# Patient Record
Sex: MALE | Race: OTHER | URBAN - METROPOLITAN AREA
[De-identification: names, ages, dates, MRNs, and addresses within clinical notes are randomized per-mention and may not be internally consistent; named-entity substitution may affect disease eponyms.]

---

## 2017-12-13 ENCOUNTER — EMERGENCY (EMERGENCY)
Facility: HOSPITAL | Age: 82
LOS: 1 days | Discharge: HOME | End: 2017-12-13
Attending: EMERGENCY MEDICINE
Payer: MEDICARE

## 2017-12-13 VITALS
TEMPERATURE: 98 F | SYSTOLIC BLOOD PRESSURE: 195 MMHG | HEART RATE: 63 BPM | OXYGEN SATURATION: 100 % | HEIGHT: 66 IN | DIASTOLIC BLOOD PRESSURE: 98 MMHG | RESPIRATION RATE: 16 BRPM | WEIGHT: 145.06 LBS

## 2017-12-13 DIAGNOSIS — Z90.79 ACQUIRED ABSENCE OF OTHER GENITAL ORGAN(S): Chronic | ICD-10-CM

## 2017-12-13 DIAGNOSIS — Z98.890 OTHER SPECIFIED POSTPROCEDURAL STATES: Chronic | ICD-10-CM

## 2017-12-13 PROCEDURE — 99282 EMERGENCY DEPT VISIT SF MDM: CPT

## 2017-12-13 PROCEDURE — 99283 EMERGENCY DEPT VISIT LOW MDM: CPT

## 2017-12-13 NOTE — ED PROVIDER NOTE - OBJECTIVE STATEMENT
84 y.o. male as per daughter, pt c/o upper eye lid discomfort on & off for past 2 yrs., for past few mos with increased irritation, no blurred vision, no teaaring, pt saw optometrist yest., told inflammation to Lt eye, given antibiotic ointment without improvement, no discharge, itchiness, 84 y.o. male as per daughter, pt c/o upper eye lid discomfort on & off for past 2 yrs., for past few mos with increased irritation, no blurred vision, no teaaring, pt saw optometrist yest., told inflammation to Lt eye, given antibiotic ointment without improvement, no discharge, itchiness,  Pt was treated with Tobramycin in 8/17 for similar problem

## 2017-12-13 NOTE — ED ADULT NURSE NOTE - CHIEF COMPLAINT
The patient is a 84y Male complaining of The patient is a 84y Male complaining of increasing irritation left eye.

## 2017-12-13 NOTE — ED PROVIDER NOTE - EYES, MLM
Clear bilaterally, pupils equal, round and reactive to light. EOMI, no corneal abrasion, no preseptal cellulitis

## 2021-03-22 ENCOUNTER — INPATIENT (INPATIENT)
Facility: HOSPITAL | Age: 86
LOS: 5 days | Discharge: HOME | DRG: 871 | End: 2021-03-28
Attending: INTERNAL MEDICINE | Admitting: INTERNAL MEDICINE
Payer: MEDICARE

## 2021-03-22 VITALS
HEART RATE: 80 BPM | RESPIRATION RATE: 20 BRPM | WEIGHT: 160.06 LBS | HEIGHT: 66 IN | DIASTOLIC BLOOD PRESSURE: 90 MMHG | OXYGEN SATURATION: 100 % | TEMPERATURE: 97 F | SYSTOLIC BLOOD PRESSURE: 155 MMHG

## 2021-03-22 DIAGNOSIS — Z29.9 ENCOUNTER FOR PROPHYLACTIC MEASURES, UNSPECIFIED: ICD-10-CM

## 2021-03-22 DIAGNOSIS — E87.1 HYPO-OSMOLALITY AND HYPONATREMIA: ICD-10-CM

## 2021-03-22 DIAGNOSIS — I48.91 UNSPECIFIED ATRIAL FIBRILLATION: ICD-10-CM

## 2021-03-22 DIAGNOSIS — Z90.79 ACQUIRED ABSENCE OF OTHER GENITAL ORGAN(S): Chronic | ICD-10-CM

## 2021-03-22 DIAGNOSIS — I10 ESSENTIAL (PRIMARY) HYPERTENSION: ICD-10-CM

## 2021-03-22 DIAGNOSIS — R09.89 OTHER SPECIFIED SYMPTOMS AND SIGNS INVOLVING THE CIRCULATORY AND RESPIRATORY SYSTEMS: ICD-10-CM

## 2021-03-22 DIAGNOSIS — R79.89 OTHER SPECIFIED ABNORMAL FINDINGS OF BLOOD CHEMISTRY: ICD-10-CM

## 2021-03-22 DIAGNOSIS — R06.02 SHORTNESS OF BREATH: ICD-10-CM

## 2021-03-22 DIAGNOSIS — A41.9 SEPSIS, UNSPECIFIED ORGANISM: ICD-10-CM

## 2021-03-22 DIAGNOSIS — R50.9 FEVER, UNSPECIFIED: ICD-10-CM

## 2021-03-22 DIAGNOSIS — J45.909 UNSPECIFIED ASTHMA, UNCOMPLICATED: ICD-10-CM

## 2021-03-22 DIAGNOSIS — Z98.890 OTHER SPECIFIED POSTPROCEDURAL STATES: Chronic | ICD-10-CM

## 2021-03-22 PROBLEM — M10.9 GOUT, UNSPECIFIED: Chronic | Status: ACTIVE | Noted: 2017-12-13

## 2021-03-22 PROBLEM — I82.409 ACUTE EMBOLISM AND THROMBOSIS OF UNSPECIFIED DEEP VEINS OF UNSPECIFIED LOWER EXTREMITY: Chronic | Status: ACTIVE | Noted: 2017-12-13

## 2021-03-22 PROBLEM — C44.90 UNSPECIFIED MALIGNANT NEOPLASM OF SKIN, UNSPECIFIED: Chronic | Status: ACTIVE | Noted: 2017-12-13

## 2021-03-22 LAB
ALBUMIN SERPL ELPH-MCNC: 3.1 G/DL — LOW (ref 3.5–5)
ALP SERPL-CCNC: 122 U/L — HIGH (ref 40–120)
ALT FLD-CCNC: 75 U/L DA — HIGH (ref 10–60)
ANION GAP SERPL CALC-SCNC: 7 MMOL/L — SIGNIFICANT CHANGE UP (ref 5–17)
APPEARANCE UR: CLEAR — SIGNIFICANT CHANGE UP
APTT BLD: 37.6 SEC — HIGH (ref 27.5–35.5)
AST SERPL-CCNC: 62 U/L — HIGH (ref 10–40)
BASOPHILS # BLD AUTO: 0 K/UL — SIGNIFICANT CHANGE UP (ref 0–0.2)
BASOPHILS NFR BLD AUTO: 0 % — SIGNIFICANT CHANGE UP (ref 0–2)
BILIRUB SERPL-MCNC: 1.7 MG/DL — HIGH (ref 0.2–1.2)
BILIRUB UR-MCNC: NEGATIVE — SIGNIFICANT CHANGE UP
BUN SERPL-MCNC: 11 MG/DL — SIGNIFICANT CHANGE UP (ref 7–18)
CALCIUM SERPL-MCNC: 8.5 MG/DL — SIGNIFICANT CHANGE UP (ref 8.4–10.5)
CHLORIDE SERPL-SCNC: 98 MMOL/L — SIGNIFICANT CHANGE UP (ref 96–108)
CK SERPL-CCNC: 101 U/L — SIGNIFICANT CHANGE UP (ref 35–232)
CO2 SERPL-SCNC: 27 MMOL/L — SIGNIFICANT CHANGE UP (ref 22–31)
COLOR SPEC: YELLOW — SIGNIFICANT CHANGE UP
CREAT SERPL-MCNC: 1.25 MG/DL — SIGNIFICANT CHANGE UP (ref 0.5–1.3)
DIFF PNL FLD: ABNORMAL
EOSINOPHIL # BLD AUTO: 0 K/UL — SIGNIFICANT CHANGE UP (ref 0–0.5)
EOSINOPHIL NFR BLD AUTO: 0 % — SIGNIFICANT CHANGE UP (ref 0–6)
GLUCOSE SERPL-MCNC: 113 MG/DL — HIGH (ref 70–99)
GLUCOSE UR QL: NEGATIVE — SIGNIFICANT CHANGE UP
HCT VFR BLD CALC: 39.9 % — SIGNIFICANT CHANGE UP (ref 39–50)
HGB BLD-MCNC: 13.3 G/DL — SIGNIFICANT CHANGE UP (ref 13–17)
INR BLD: 2.6 RATIO — HIGH (ref 0.88–1.16)
KETONES UR-MCNC: NEGATIVE — SIGNIFICANT CHANGE UP
LACTATE SERPL-SCNC: 1.8 MMOL/L — SIGNIFICANT CHANGE UP (ref 0.7–2)
LACTATE SERPL-SCNC: 2.1 MMOL/L — HIGH (ref 0.7–2)
LEUKOCYTE ESTERASE UR-ACNC: NEGATIVE — SIGNIFICANT CHANGE UP
LYMPHOCYTES # BLD AUTO: 1.34 K/UL — SIGNIFICANT CHANGE UP (ref 1–3.3)
LYMPHOCYTES # BLD AUTO: 10 % — LOW (ref 13–44)
MCHC RBC-ENTMCNC: 28.4 PG — SIGNIFICANT CHANGE UP (ref 27–34)
MCHC RBC-ENTMCNC: 33.3 GM/DL — SIGNIFICANT CHANGE UP (ref 32–36)
MCV RBC AUTO: 85.3 FL — SIGNIFICANT CHANGE UP (ref 80–100)
MONOCYTES # BLD AUTO: 1.48 K/UL — HIGH (ref 0–0.9)
MONOCYTES NFR BLD AUTO: 11 % — SIGNIFICANT CHANGE UP (ref 2–14)
NEUTROPHILS # BLD AUTO: 10.6 K/UL — HIGH (ref 1.8–7.4)
NEUTROPHILS NFR BLD AUTO: 79 % — HIGH (ref 43–77)
NITRITE UR-MCNC: NEGATIVE — SIGNIFICANT CHANGE UP
NT-PROBNP SERPL-SCNC: 2588 PG/ML — HIGH (ref 0–450)
PH UR: 6 — SIGNIFICANT CHANGE UP (ref 5–8)
PLATELET # BLD AUTO: 212 K/UL — SIGNIFICANT CHANGE UP (ref 150–400)
POTASSIUM SERPL-MCNC: 3.5 MMOL/L — SIGNIFICANT CHANGE UP (ref 3.5–5.3)
POTASSIUM SERPL-SCNC: 3.5 MMOL/L — SIGNIFICANT CHANGE UP (ref 3.5–5.3)
PROT SERPL-MCNC: 7.4 G/DL — SIGNIFICANT CHANGE UP (ref 6–8.3)
PROT UR-MCNC: 15
PROTHROM AB SERPL-ACNC: 29.6 SEC — HIGH (ref 10.6–13.6)
RAPID RVP RESULT: SIGNIFICANT CHANGE UP
RBC # BLD: 4.68 M/UL — SIGNIFICANT CHANGE UP (ref 4.2–5.8)
RBC # FLD: 14.5 % — SIGNIFICANT CHANGE UP (ref 10.3–14.5)
SARS-COV-2 RNA SPEC QL NAA+PROBE: SIGNIFICANT CHANGE UP
SARS-COV-2 RNA SPEC QL NAA+PROBE: SIGNIFICANT CHANGE UP
SODIUM SERPL-SCNC: 132 MMOL/L — LOW (ref 135–145)
SP GR SPEC: 1.01 — SIGNIFICANT CHANGE UP (ref 1.01–1.02)
TROPONIN I SERPL-MCNC: 0.03 NG/ML — SIGNIFICANT CHANGE UP (ref 0–0.04)
UROBILINOGEN FLD QL: NEGATIVE — SIGNIFICANT CHANGE UP
WBC # BLD: 13.42 K/UL — HIGH (ref 3.8–10.5)
WBC # FLD AUTO: 13.42 K/UL — HIGH (ref 3.8–10.5)

## 2021-03-22 PROCEDURE — 71275 CT ANGIOGRAPHY CHEST: CPT | Mod: 26

## 2021-03-22 PROCEDURE — 71045 X-RAY EXAM CHEST 1 VIEW: CPT | Mod: 26

## 2021-03-22 PROCEDURE — 99285 EMERGENCY DEPT VISIT HI MDM: CPT | Mod: CS

## 2021-03-22 RX ORDER — METOPROLOL TARTRATE 50 MG
1 TABLET ORAL
Qty: 0 | Refills: 0 | DISCHARGE

## 2021-03-22 RX ORDER — ALPRAZOLAM 0.25 MG
0.5 TABLET ORAL AT BEDTIME
Refills: 0 | Status: DISCONTINUED | OUTPATIENT
Start: 2021-03-22 | End: 2021-03-28

## 2021-03-22 RX ORDER — METOPROLOL TARTRATE 50 MG
12.5 TABLET ORAL
Refills: 0 | Status: DISCONTINUED | OUTPATIENT
Start: 2021-03-22 | End: 2021-03-28

## 2021-03-22 RX ORDER — AZITHROMYCIN 500 MG/1
500 TABLET, FILM COATED ORAL EVERY 24 HOURS
Refills: 0 | Status: DISCONTINUED | OUTPATIENT
Start: 2021-03-22 | End: 2021-03-24

## 2021-03-22 RX ORDER — CEFTRIAXONE 500 MG/1
1000 INJECTION, POWDER, FOR SOLUTION INTRAMUSCULAR; INTRAVENOUS EVERY 24 HOURS
Refills: 0 | Status: DISCONTINUED | OUTPATIENT
Start: 2021-03-22 | End: 2021-03-28

## 2021-03-22 RX ORDER — WARFARIN SODIUM 2.5 MG/1
2 TABLET ORAL ONCE
Refills: 0 | Status: COMPLETED | OUTPATIENT
Start: 2021-03-22 | End: 2021-03-22

## 2021-03-22 RX ORDER — SODIUM CHLORIDE 9 MG/ML
500 INJECTION INTRAMUSCULAR; INTRAVENOUS; SUBCUTANEOUS ONCE
Refills: 0 | Status: COMPLETED | OUTPATIENT
Start: 2021-03-22 | End: 2021-03-22

## 2021-03-22 RX ORDER — CEFTRIAXONE 500 MG/1
1000 INJECTION, POWDER, FOR SOLUTION INTRAMUSCULAR; INTRAVENOUS ONCE
Refills: 0 | Status: COMPLETED | OUTPATIENT
Start: 2021-03-22 | End: 2021-03-22

## 2021-03-22 RX ORDER — ALPRAZOLAM 0.25 MG
1 TABLET ORAL
Qty: 0 | Refills: 0 | DISCHARGE

## 2021-03-22 RX ORDER — BUDESONIDE AND FORMOTEROL FUMARATE DIHYDRATE 160; 4.5 UG/1; UG/1
2 AEROSOL RESPIRATORY (INHALATION)
Refills: 0 | Status: DISCONTINUED | OUTPATIENT
Start: 2021-03-22 | End: 2021-03-28

## 2021-03-22 RX ORDER — ACETAMINOPHEN 500 MG
975 TABLET ORAL ONCE
Refills: 0 | Status: COMPLETED | OUTPATIENT
Start: 2021-03-22 | End: 2021-03-22

## 2021-03-22 RX ORDER — WARFARIN SODIUM 2.5 MG/1
1 TABLET ORAL
Qty: 0 | Refills: 0 | DISCHARGE

## 2021-03-22 RX ORDER — WARFARIN SODIUM 2.5 MG/1
2 TABLET ORAL AT BEDTIME
Refills: 0 | Status: DISCONTINUED | OUTPATIENT
Start: 2021-03-22 | End: 2021-03-22

## 2021-03-22 RX ORDER — ALBUTEROL 90 UG/1
2 AEROSOL, METERED ORAL EVERY 6 HOURS
Refills: 0 | Status: DISCONTINUED | OUTPATIENT
Start: 2021-03-22 | End: 2021-03-28

## 2021-03-22 RX ADMIN — SODIUM CHLORIDE 500 MILLILITER(S): 9 INJECTION INTRAMUSCULAR; INTRAVENOUS; SUBCUTANEOUS at 14:42

## 2021-03-22 RX ADMIN — WARFARIN SODIUM 2 MILLIGRAM(S): 2.5 TABLET ORAL at 22:52

## 2021-03-22 RX ADMIN — SODIUM CHLORIDE 500 MILLILITER(S): 9 INJECTION INTRAMUSCULAR; INTRAVENOUS; SUBCUTANEOUS at 14:59

## 2021-03-22 RX ADMIN — ALBUTEROL 2 PUFF(S): 90 AEROSOL, METERED ORAL at 22:51

## 2021-03-22 RX ADMIN — CEFTRIAXONE 100 MILLIGRAM(S): 500 INJECTION, POWDER, FOR SOLUTION INTRAMUSCULAR; INTRAVENOUS at 14:42

## 2021-03-22 RX ADMIN — Medication 975 MILLIGRAM(S): at 11:47

## 2021-03-22 RX ADMIN — Medication 975 MILLIGRAM(S): at 14:14

## 2021-03-22 RX ADMIN — Medication 12.5 MILLIGRAM(S): at 22:46

## 2021-03-22 RX ADMIN — CEFTRIAXONE 1000 MILLIGRAM(S): 500 INJECTION, POWDER, FOR SOLUTION INTRAMUSCULAR; INTRAVENOUS at 14:59

## 2021-03-22 RX ADMIN — BUDESONIDE AND FORMOTEROL FUMARATE DIHYDRATE 2 PUFF(S): 160; 4.5 AEROSOL RESPIRATORY (INHALATION) at 19:52

## 2021-03-22 RX ADMIN — Medication 0.5 MILLIGRAM(S): at 22:47

## 2021-03-22 RX ADMIN — Medication 40 MILLIGRAM(S): at 19:51

## 2021-03-22 RX ADMIN — ALBUTEROL 2 PUFF(S): 90 AEROSOL, METERED ORAL at 16:35

## 2021-03-22 NOTE — H&P ADULT - ATTENDING COMMENTS
88 yo M pt with a PMH of hard of hearing, asthma, DVT, PE x 3, HTN, afib, skin cancer, presents to the ED with complaints of worsening cough and shortness of breath.    T(C): 36.4 (03-22-21 @ 16:21), Max: 38.4 (03-22-21 @ 11:19)  HR: 71 (03-22-21 @ 16:21) (71 - 80)  BP: 145/79 (03-22-21 @ 16:21) (121/77 - 155/90)  RR: 18 (03-22-21 @ 16:21) (18 - 20)  SpO2: 99% (03-22-21 @ 16:21) (98% - 100%)    MEDICATIONS  (STANDING):  ALBUTerol    90 MICROgram(s) HFA Inhaler 2 Puff(s) Inhalation every 6 hours  ALPRAZolam 0.5 milliGRAM(s) Oral at bedtime  azithromycin  IVPB 500 milliGRAM(s) IV Intermittent every 24 hours  budesonide 160 MICROgram(s)/formoterol 4.5 MICROgram(s) Inhaler 2 Puff(s) Inhalation two times a day  cefTRIAXone   IVPB 1000 milliGRAM(s) IV Intermittent every 24 hours  metoprolol tartrate 12.5 milliGRAM(s) Oral two times a day  warfarin 2 milliGRAM(s) Oral at bedtime    MEDICATIONS  (PRN): 86 yo M pt with a PMH of hard of hearing, asthma, DVT, PE x 3, HTN, afib, skin cancer, presents to the ED with complaints of worsening cough and shortness of breath.    T(C): 36.4 (03-22-21 @ 16:21), Max: 38.4 (03-22-21 @ 11:19)  HR: 71 (03-22-21 @ 16:21) (71 - 80)  BP: 145/79 (03-22-21 @ 16:21) (121/77 - 155/90)  RR: 18 (03-22-21 @ 16:21) (18 - 20)  SpO2: 99% (03-22-21 @ 16:21) (98% - 100%)    MEDICATIONS  (STANDING):  ALBUTerol    90 MICROgram(s) HFA Inhaler 2 Puff(s) Inhalation every 6 hours  ALPRAZolam 0.5 milliGRAM(s) Oral at bedtime  azithromycin  IVPB 500 milliGRAM(s) IV Intermittent every 24 hours  budesonide 160 MICROgram(s)/formoterol 4.5 MICROgram(s) Inhaler 2 Puff(s) Inhalation two times a day  cefTRIAXone   IVPB 1000 milliGRAM(s) IV Intermittent every 24 hours  metoprolol tartrate 12.5 milliGRAM(s) Oral two times a day  warfarin 2 milliGRAM(s) Oral at bedtime    MEDICATIONS  (PRN):    Pneumonia  Sepsis  Asthma exacerbation  PE   A.fib   HTN       iv Ceftriaxone and Zithromax, nebs, Solumedrol iv.   Continue with Coumadin.  Monitor PT/INR.    Pulm consult.

## 2021-03-22 NOTE — ED PROVIDER NOTE - CARE PLAN
Principal Discharge DX:	Fever and chills  Secondary Diagnosis:	Malaise  Secondary Diagnosis:	Fever   Principal Discharge DX:	Fever and chills  Secondary Diagnosis:	Malaise  Secondary Diagnosis:	Fever  Secondary Diagnosis:	Cough

## 2021-03-22 NOTE — ED PROVIDER NOTE - CLINICAL SUMMARY MEDICAL DECISION MAKING FREE TEXT BOX
Patient with cough and shortness of breath. O2 sat in the high 90s on room air. Will do labs, CXR and reassess. Patient with cough and shortness of breath. O2 sat  98 on room air. Will do labs, CXR and reassess.

## 2021-03-22 NOTE — H&P ADULT - PROBLEM SELECTOR PLAN 1
Pt presented with cough sob x1 week approx    ED vitals Temp 101F, wbc 13k   COVID and UA negative   Pending RVP Pt presented with cough sob x1 week approx    ED vitals Temp 101F, wbc 13k , source likely viral infection   s/p rocephin and IVF bolus in ed   COVID and UA negative   Pending RVP  will start on empiric antibiotics Zithro and rocephin   fu urine and blood cultures   fu procal, esr, crp, legionella

## 2021-03-22 NOTE — H&P ADULT - PROBLEM SELECTOR PLAN 6
IMPROVE VTE Individual Risk Assessment  RISK                                                         Points  [  ] Previous VTE                                      3  [  ] Thrombophilia                                   2  [  ] Lower limb paralysis                         2 (unable to hold up >15 seconds)    [  ] Current Cancer                                  2       (within 6 months)  [  ] Immobilization > 24 hrs                    1  [  ] ICU/CCU stay > 24 hrs                         1  [  ] Age > 60                                              1    cw coumadin for dvt ppx pt is on coumadin 2 mg qd, metoprolol suc 25 qd  will cw lopressor 12.5 bid and coumadin   fu INR QD pt with mildly elevated lft   will send hepatitis panel   will order Hepatic US

## 2021-03-22 NOTE — H&P ADULT - NSHPPHYSICALEXAM_GEN_ALL_CORE
Vital Signs Last 24 Hrs  T(C): 36.4 (22 Mar 2021 16:21), Max: 38.4 (22 Mar 2021 11:19)  T(F): 97.6 (22 Mar 2021 16:21), Max: 101.2 (22 Mar 2021 11:19)  HR: 71 (22 Mar 2021 16:21) (71 - 80)  BP: 145/79 (22 Mar 2021 16:21) (121/77 - 155/90)  BP(mean): --  RR: 18 (22 Mar 2021 16:21) (18 - 20)  SpO2: 99% (22 Mar 2021 16:21) (98% - 100%)      GENERAL: NAD, lying in bed comfortably  HEAD:  Atraumatic, Normocephalic  EYES: EOMI, PERRLA, conjunctiva and sclera clear  ENT: Moist mucous membranes  NECK: Supple, No JVD  CHEST/LUNG: wheezing bl on expiration   HEART: Regular rate and rhythm; S1+ S2+  ABDOMEN: Bowel sounds present; Soft, Nontender, Nondistended. No hepatomegaly  EXTREMITIES:  2+ Peripheral Pulses, brisk capillary refill. No clubbing, cyanosis, or edema  NERVOUS SYSTEM:  hard of hearing  MSK: FROM all 4 extremities, full and equal strength  SKIN: No rashes or lesions

## 2021-03-22 NOTE — H&P ADULT - NSHPLABSRESULTS_GEN_ALL_CORE
< from: Xray Chest 1 View-PORTABLE IMMEDIATE (03.22.21 @ 14:33) >    EXAM:  XR CHEST PORTABLE IMMED 1V                            PROCEDURE DATE:  03/22/2021          INTERPRETATION:  CLINICAL STATEMENT: Chest Pain.    TECHNIQUE: AP view of the chest.    COMPARISON: None available.    FINDINGS/  IMPRESSION:  No consolidation or pleural effusion    Heart size cannot be accurately assessed in this projection.      < end of copied text >

## 2021-03-22 NOTE — H&P ADULT - PROBLEM SELECTOR PLAN 5
pt is on coumadin 2 mg qd, metoprolol suc 25 qd  will cw lopressor 12.5 bid and coumadin   fu INR QD Pt at home on losartan and metoprolol suc 25 qd   will hold losartan in the setting of sepsis   will cw lopressor 12.5 bid

## 2021-03-22 NOTE — H&P ADULT - PROBLEM SELECTOR PLAN 8
IMPROVE VTE Individual Risk Assessment  RISK                                                         Points  [  ] Previous VTE                                      3  [  ] Thrombophilia                                   2  [  ] Lower limb paralysis                         2 (unable to hold up >15 seconds)    [  ] Current Cancer                                  2       (within 6 months)  [  ] Immobilization > 24 hrs                    1  [  ] ICU/CCU stay > 24 hrs                         1  [  ] Age > 60                                              1    cw coumadin for dvt ppx

## 2021-03-22 NOTE — ED ADULT NURSE NOTE - NSIMPLEMENTINTERV_GEN_ALL_ED
Implemented All Fall Risk Interventions:  [place] to call system. Call bell, personal items and telephone within reach. Instruct patient to call for assistance. Room bathroom lighting operational. Non-slip footwear when patient is off stretcher. Physically safe environment: no spills, clutter or unnecessary equipment. Stretcher in lowest position, wheels locked, appropriate side rails in place. Provide visual cue, wrist band, yellow gown, etc. Monitor gait and stability. Monitor for mental status changes and reorient to person, place, and time. Review medications for side effects contributing to fall risk. Reinforce activity limits and safety measures with patient and family.

## 2021-03-22 NOTE — H&P ADULT - PROBLEM SELECTOR PLAN 7
IMPROVE VTE Individual Risk Assessment  RISK                                                         Points  [  ] Previous VTE                                      3  [  ] Thrombophilia                                   2  [  ] Lower limb paralysis                         2 (unable to hold up >15 seconds)    [  ] Current Cancer                                  2       (within 6 months)  [  ] Immobilization > 24 hrs                    1  [  ] ICU/CCU stay > 24 hrs                         1  [  ] Age > 60                                              1    cw coumadin for dvt ppx soft/tender... pt is on coumadin 2 mg qd, metoprolol suc 25 qd  will cw lopressor 12.5 bid and coumadin   fu INR QD

## 2021-03-22 NOTE — ED PROVIDER NOTE - PROGRESS NOTE DETAILS
Patient is awake, Xray normal. Will do CT angio given history of PE. Mildly elevated lactic acid. Now normal. patient did not receive full bolus of 30cc/kg because of elevated pro-bnp and concern for fluid overload. plan admit antibiotics, infectious workup. I spoke with family, patient has been having cough shortness of breath malaise for a few days. on coumadin for afib. received 1st covid vaccine 2 weeks Patient is awake, Xray normal. Will do CT angio given history of PE. Mildly elevated lactic acid. Now normal after 500cc bolus. patient did not receive full bolus of 30cc/kg because of elevated pro-bnp and concern for fluid overload. plan admit antibiotics, infectious workup. I spoke with family, patient has been having cough shortness of breath malaise for a few days. on coumadin for afib. received 1st covid vaccine 2 weeks

## 2021-03-22 NOTE — ED PROVIDER NOTE - NS ED ATTENDING STATEMENT MOD
At 1945 this RN was notified patient was omar in the 30's and patient was hypotensive 
47/36. This RN went into assess the patient. He was non responsive at arrival to bedside but 
patient had a palpable pulse at 33. 1 dose of Atropine given with no increase to heart rate. 
 Patient became asystole cpr was initiated at 1953 one dose of epi was given ROSC was 
achieved at 1956.  Levo gtt was increased to maintain bp to max of 30mcg and this RN will 
titrate down to maintain bp as needed. MD and family was notified.  Will continue to 
monitor. Attending Only

## 2021-03-22 NOTE — H&P ADULT - ASSESSMENT
88 yo M pt with a PMH of hard of hearing, asthma, DVT, PE x 3, HTN, afib, skin cancer, presents to the ED with complaints of worsening cough and shortness of breath. Pt states he was feeling sob for a few days.

## 2021-03-22 NOTE — H&P ADULT - HISTORY OF PRESENT ILLNESS
88 yo M pt with a PMH of hard of hearing, asthma, DVT, PE x 3, HTN, afib, skin cancer, presents to the ED with complaints of worsening cough and shortness of breath. Pt states he was feeling sob for a few days. Pt is having LE pain but states that is chronic As per Daughter pt has new eye discharge for a few days as well but was unable to get him to see an eye doctor. Pt is unable to contribute any further to hx bc he wants to rest. Pt denies chest pain , abdominal pain, n/v/d or any other complaints    Off note pt 1st COVID-19 vaccine but not his 2nd as yet    GOC: full code

## 2021-03-22 NOTE — H&P ADULT - PROBLEM SELECTOR PLAN 4
Pt at home on losartan and metoprolol suc 25 qd   will hold losartan in the setting of sepsis   will cw lopressor 12.5 bid Pt presented with hyponatremia 133   fu urine lytes, pt rcv'd IV bolus in ed   will check  for legionella,  will fu am bmp

## 2021-03-22 NOTE — H&P ADULT - PROBLEM SELECTOR PLAN 2
Pt with hx of asthma, wheezing on PE   will order CTA to rule out PE   wells score 4.5; moderate risk for PE   INR therapeutic, will cw coumadin   will start on albuterol q6h and symbicort inhalers for now Pt with hx of asthma, Pulm emobolism   wheezing on P. exam   will order CTA to rule out PE   wells score 4.5; moderate risk for PE   INR therapeutic, will cw coumadin   BNP > 2k; cxr negative for pulm edema or effusions; will order echo   will start on albuterol q6h and symbicort inhalers for now Pt with hx of asthma, Pulm emobolism   wheezing on P. exam   will order CTA to rule out PE   wells score 4.5; moderate risk for PE   INR therapeutic, will cw coumadin   BNP > 2k; cxr negative for pulm edema or effusions; will order echo   will start on solu medrol 40 iv qd, albuterol q6h and symbicort inhalers for now

## 2021-03-22 NOTE — H&P ADULT - NSICDXPASTMEDICALHX_GEN_ALL_CORE_FT
PAST MEDICAL HISTORY:  Asthma     DVT (deep venous thrombosis)     Gout     HTN (hypertension)     Skin cancer

## 2021-03-22 NOTE — ED PROVIDER NOTE - OBJECTIVE STATEMENT
86 y/o M pt with a PMH of asthma, DVT, HTN, skin cancer, presents to the ED with complaints of worsening cough and shortness of breath. Patient endorses feeling congested. Notes he has chest pain when coughing. Patient denies nausea, vomiting, diarrhea or any other complaints. Notes he has not taken antibiotics recently. States he had his 1st COVID-19 vaccine but not his 2nd as yet. 88 y/o M pt with a PMH of asthma, DVT, HTN, afib, skin cancer, presents to the ED with complaints of worsening cough and shortness of breath. Patient endorses feeling congested. Notes he has chest pain when coughing. Patient denies nausea, vomiting, diarrhea or any other complaints. Notes he has not taken antibiotics recently. States he had his 1st COVID-19 vaccine but not his 2nd as yet.

## 2021-03-23 DIAGNOSIS — Z71.89 OTHER SPECIFIED COUNSELING: ICD-10-CM

## 2021-03-23 LAB
A1C WITH ESTIMATED AVERAGE GLUCOSE RESULT: 5.8 % — HIGH (ref 4–5.6)
ANION GAP SERPL CALC-SCNC: 8 MMOL/L — SIGNIFICANT CHANGE UP (ref 5–17)
APTT BLD: 35.5 SEC — SIGNIFICANT CHANGE UP (ref 27.5–35.5)
BILIRUB DIRECT SERPL-MCNC: 0.3 MG/DL — HIGH (ref 0–0.2)
BILIRUB INDIRECT FLD-MCNC: 0.5 MG/DL — SIGNIFICANT CHANGE UP (ref 0.2–1)
BILIRUB SERPL-MCNC: 0.8 MG/DL — SIGNIFICANT CHANGE UP (ref 0.2–1.2)
BUN SERPL-MCNC: 13 MG/DL — SIGNIFICANT CHANGE UP (ref 7–18)
CALCIUM SERPL-MCNC: 8.8 MG/DL — SIGNIFICANT CHANGE UP (ref 8.4–10.5)
CHLORIDE SERPL-SCNC: 100 MMOL/L — SIGNIFICANT CHANGE UP (ref 96–108)
CHOLEST SERPL-MCNC: 125 MG/DL — SIGNIFICANT CHANGE UP
CO2 SERPL-SCNC: 25 MMOL/L — SIGNIFICANT CHANGE UP (ref 22–31)
COVID-19 SPIKE DOMAIN AB INTERP: POSITIVE
COVID-19 SPIKE DOMAIN ANTIBODY RESULT: 3.79 U/ML — HIGH
CREAT SERPL-MCNC: 1.23 MG/DL — SIGNIFICANT CHANGE UP (ref 0.5–1.3)
CRP SERPL-MCNC: 92 MG/L — HIGH
CULTURE RESULTS: SIGNIFICANT CHANGE UP
ERYTHROCYTE [SEDIMENTATION RATE] IN BLOOD: 33 MM/HR — HIGH (ref 0–20)
ESTIMATED AVERAGE GLUCOSE: 120 MG/DL — HIGH (ref 68–114)
FERRITIN SERPL-MCNC: 151 NG/ML — SIGNIFICANT CHANGE UP (ref 30–400)
FOLATE SERPL-MCNC: 13.4 NG/ML — SIGNIFICANT CHANGE UP
GLUCOSE SERPL-MCNC: 180 MG/DL — HIGH (ref 70–99)
HAV IGM SER-ACNC: SIGNIFICANT CHANGE UP
HBV CORE IGM SER-ACNC: SIGNIFICANT CHANGE UP
HBV SURFACE AG SER-ACNC: SIGNIFICANT CHANGE UP
HCT VFR BLD CALC: 41 % — SIGNIFICANT CHANGE UP (ref 39–50)
HCV AB S/CO SERPL IA: 0.1 S/CO — SIGNIFICANT CHANGE UP (ref 0–0.99)
HCV AB SERPL-IMP: SIGNIFICANT CHANGE UP
HDLC SERPL-MCNC: 40 MG/DL — LOW
HGB BLD-MCNC: 13.7 G/DL — SIGNIFICANT CHANGE UP (ref 13–17)
INR BLD: 2.22 RATIO — HIGH (ref 0.88–1.16)
LDH SERPL L TO P-CCNC: 169 U/L — SIGNIFICANT CHANGE UP (ref 120–225)
LIPID PNL WITH DIRECT LDL SERPL: 77 MG/DL — SIGNIFICANT CHANGE UP
MAGNESIUM SERPL-MCNC: 2.3 MG/DL — SIGNIFICANT CHANGE UP (ref 1.6–2.6)
MCHC RBC-ENTMCNC: 28.2 PG — SIGNIFICANT CHANGE UP (ref 27–34)
MCHC RBC-ENTMCNC: 33.4 GM/DL — SIGNIFICANT CHANGE UP (ref 32–36)
MCV RBC AUTO: 84.4 FL — SIGNIFICANT CHANGE UP (ref 80–100)
NON HDL CHOLESTEROL: 85 MG/DL — SIGNIFICANT CHANGE UP
NRBC # BLD: 0 /100 WBCS — SIGNIFICANT CHANGE UP (ref 0–0)
PHOSPHATE SERPL-MCNC: 2.9 MG/DL — SIGNIFICANT CHANGE UP (ref 2.5–4.5)
PLATELET # BLD AUTO: 222 K/UL — SIGNIFICANT CHANGE UP (ref 150–400)
POTASSIUM SERPL-MCNC: 4.1 MMOL/L — SIGNIFICANT CHANGE UP (ref 3.5–5.3)
POTASSIUM SERPL-SCNC: 4.1 MMOL/L — SIGNIFICANT CHANGE UP (ref 3.5–5.3)
PROCALCITONIN SERPL-MCNC: 0.06 NG/ML — SIGNIFICANT CHANGE UP (ref 0.02–0.1)
PROTHROM AB SERPL-ACNC: 25.4 SEC — HIGH (ref 10.6–13.6)
RBC # BLD: 4.86 M/UL — SIGNIFICANT CHANGE UP (ref 4.2–5.8)
RBC # FLD: 14.4 % — SIGNIFICANT CHANGE UP (ref 10.3–14.5)
SARS-COV-2 IGG+IGM SERPL QL IA: 3.79 U/ML — HIGH
SARS-COV-2 IGG+IGM SERPL QL IA: POSITIVE
SODIUM SERPL-SCNC: 133 MMOL/L — LOW (ref 135–145)
SPECIMEN SOURCE: SIGNIFICANT CHANGE UP
TRIGL SERPL-MCNC: 39 MG/DL — SIGNIFICANT CHANGE UP
TSH SERPL-MCNC: 0.48 UU/ML — SIGNIFICANT CHANGE UP (ref 0.34–4.82)
VIT B12 SERPL-MCNC: 238 PG/ML — SIGNIFICANT CHANGE UP (ref 232–1245)
WBC # BLD: 12.52 K/UL — HIGH (ref 3.8–10.5)
WBC # FLD AUTO: 12.52 K/UL — HIGH (ref 3.8–10.5)

## 2021-03-23 RX ORDER — WARFARIN SODIUM 2.5 MG/1
2 TABLET ORAL ONCE
Refills: 0 | Status: COMPLETED | OUTPATIENT
Start: 2021-03-23 | End: 2021-03-23

## 2021-03-23 RX ADMIN — BUDESONIDE AND FORMOTEROL FUMARATE DIHYDRATE 2 PUFF(S): 160; 4.5 AEROSOL RESPIRATORY (INHALATION) at 10:21

## 2021-03-23 RX ADMIN — BUDESONIDE AND FORMOTEROL FUMARATE DIHYDRATE 2 PUFF(S): 160; 4.5 AEROSOL RESPIRATORY (INHALATION) at 21:29

## 2021-03-23 RX ADMIN — CEFTRIAXONE 100 MILLIGRAM(S): 500 INJECTION, POWDER, FOR SOLUTION INTRAMUSCULAR; INTRAVENOUS at 13:30

## 2021-03-23 RX ADMIN — Medication 12.5 MILLIGRAM(S): at 05:43

## 2021-03-23 RX ADMIN — Medication 12.5 MILLIGRAM(S): at 17:07

## 2021-03-23 RX ADMIN — Medication 0.5 MILLIGRAM(S): at 21:43

## 2021-03-23 RX ADMIN — AZITHROMYCIN 255 MILLIGRAM(S): 500 TABLET, FILM COATED ORAL at 05:43

## 2021-03-23 RX ADMIN — ALBUTEROL 2 PUFF(S): 90 AEROSOL, METERED ORAL at 21:30

## 2021-03-23 RX ADMIN — Medication 40 MILLIGRAM(S): at 02:49

## 2021-03-23 RX ADMIN — ALBUTEROL 2 PUFF(S): 90 AEROSOL, METERED ORAL at 16:36

## 2021-03-23 RX ADMIN — ALBUTEROL 2 PUFF(S): 90 AEROSOL, METERED ORAL at 10:22

## 2021-03-23 NOTE — PROVIDER CONTACT NOTE (MEDICATION) - BACKGROUND
CT Angio chest was negative for PE, elevated LFTs, awaiting Hepatic / Pancreatic US, was given 40mg solumedrol at 1951

## 2021-03-23 NOTE — PROGRESS NOTE ADULT - PROBLEM SELECTOR PLAN 8
Goals of care d/w health care provider - pt's niece at documented # on file who stated that pt is full code and is to be intubated and added that "everything is to be done necessary to revive him". Goals of care d/w health care provider - Marissa at documented # on file who stated that pt is full code and is to be intubated as her and her mother is "not ready to make the decision not to resuscitate as yet"

## 2021-03-24 LAB
ANION GAP SERPL CALC-SCNC: 8 MMOL/L — SIGNIFICANT CHANGE UP (ref 5–17)
APTT BLD: 33.5 SEC — SIGNIFICANT CHANGE UP (ref 27.5–35.5)
BUN SERPL-MCNC: 22 MG/DL — HIGH (ref 7–18)
CALCIUM SERPL-MCNC: 8.4 MG/DL — SIGNIFICANT CHANGE UP (ref 8.4–10.5)
CHLORIDE SERPL-SCNC: 98 MMOL/L — SIGNIFICANT CHANGE UP (ref 96–108)
CO2 SERPL-SCNC: 26 MMOL/L — SIGNIFICANT CHANGE UP (ref 22–31)
CREAT SERPL-MCNC: 1.25 MG/DL — SIGNIFICANT CHANGE UP (ref 0.5–1.3)
GLUCOSE SERPL-MCNC: 154 MG/DL — HIGH (ref 70–99)
HCT VFR BLD CALC: 35 % — LOW (ref 39–50)
HGB BLD-MCNC: 11.8 G/DL — LOW (ref 13–17)
INR BLD: 2.24 RATIO — HIGH (ref 0.88–1.16)
MAGNESIUM SERPL-MCNC: 2.2 MG/DL — SIGNIFICANT CHANGE UP (ref 1.6–2.6)
MCHC RBC-ENTMCNC: 28.4 PG — SIGNIFICANT CHANGE UP (ref 27–34)
MCHC RBC-ENTMCNC: 33.7 GM/DL — SIGNIFICANT CHANGE UP (ref 32–36)
MCV RBC AUTO: 84.3 FL — SIGNIFICANT CHANGE UP (ref 80–100)
NRBC # BLD: 0 /100 WBCS — SIGNIFICANT CHANGE UP (ref 0–0)
PLATELET # BLD AUTO: 243 K/UL — SIGNIFICANT CHANGE UP (ref 150–400)
POTASSIUM SERPL-MCNC: 4 MMOL/L — SIGNIFICANT CHANGE UP (ref 3.5–5.3)
POTASSIUM SERPL-SCNC: 4 MMOL/L — SIGNIFICANT CHANGE UP (ref 3.5–5.3)
PROTHROM AB SERPL-ACNC: 25.7 SEC — HIGH (ref 10.6–13.6)
RBC # BLD: 4.15 M/UL — LOW (ref 4.2–5.8)
RBC # FLD: 14.5 % — SIGNIFICANT CHANGE UP (ref 10.3–14.5)
SODIUM SERPL-SCNC: 132 MMOL/L — LOW (ref 135–145)
WBC # BLD: 22 K/UL — HIGH (ref 3.8–10.5)
WBC # FLD AUTO: 22 K/UL — HIGH (ref 3.8–10.5)

## 2021-03-24 PROCEDURE — 76705 ECHO EXAM OF ABDOMEN: CPT | Mod: 26

## 2021-03-24 RX ORDER — WARFARIN SODIUM 2.5 MG/1
2 TABLET ORAL ONCE
Refills: 0 | Status: COMPLETED | OUTPATIENT
Start: 2021-03-24 | End: 2021-03-24

## 2021-03-24 RX ORDER — OXYCODONE HYDROCHLORIDE 5 MG/1
2.5 TABLET ORAL EVERY 6 HOURS
Refills: 0 | Status: DISCONTINUED | OUTPATIENT
Start: 2021-03-24 | End: 2021-03-28

## 2021-03-24 RX ORDER — AZITHROMYCIN 500 MG/1
500 TABLET, FILM COATED ORAL DAILY
Refills: 0 | Status: COMPLETED | OUTPATIENT
Start: 2021-03-25 | End: 2021-03-28

## 2021-03-24 RX ADMIN — CEFTRIAXONE 100 MILLIGRAM(S): 500 INJECTION, POWDER, FOR SOLUTION INTRAMUSCULAR; INTRAVENOUS at 13:38

## 2021-03-24 RX ADMIN — WARFARIN SODIUM 2 MILLIGRAM(S): 2.5 TABLET ORAL at 20:42

## 2021-03-24 RX ADMIN — Medication 0.5 MILLIGRAM(S): at 21:35

## 2021-03-24 RX ADMIN — Medication 40 MILLIGRAM(S): at 05:07

## 2021-03-24 RX ADMIN — BUDESONIDE AND FORMOTEROL FUMARATE DIHYDRATE 2 PUFF(S): 160; 4.5 AEROSOL RESPIRATORY (INHALATION) at 21:35

## 2021-03-24 RX ADMIN — OXYCODONE HYDROCHLORIDE 2.5 MILLIGRAM(S): 5 TABLET ORAL at 17:58

## 2021-03-24 RX ADMIN — BUDESONIDE AND FORMOTEROL FUMARATE DIHYDRATE 2 PUFF(S): 160; 4.5 AEROSOL RESPIRATORY (INHALATION) at 11:21

## 2021-03-24 RX ADMIN — ALBUTEROL 2 PUFF(S): 90 AEROSOL, METERED ORAL at 15:09

## 2021-03-24 RX ADMIN — Medication 12.5 MILLIGRAM(S): at 17:58

## 2021-03-24 RX ADMIN — ALBUTEROL 2 PUFF(S): 90 AEROSOL, METERED ORAL at 09:00

## 2021-03-24 RX ADMIN — OXYCODONE HYDROCHLORIDE 2.5 MILLIGRAM(S): 5 TABLET ORAL at 18:32

## 2021-03-24 RX ADMIN — Medication 1 DROP(S): at 17:59

## 2021-03-24 RX ADMIN — AZITHROMYCIN 255 MILLIGRAM(S): 500 TABLET, FILM COATED ORAL at 05:06

## 2021-03-24 RX ADMIN — ALBUTEROL 2 PUFF(S): 90 AEROSOL, METERED ORAL at 20:43

## 2021-03-24 RX ADMIN — Medication 12.5 MILLIGRAM(S): at 08:27

## 2021-03-24 NOTE — PROGRESS NOTE ADULT - PROBLEM SELECTOR PLAN 7
-  chronic afib on coumadin 2 mg qd  -  metoprolol 12.5mg  daily  -  monitor INR daily and dose coumadin per INR -  chronic afib on coumadin 2 mg qd  -  metoprolol 12.5mg  daily  -  monitor INR daily and dose coumadin per INR  -  INR 2.24 today.  Coumadin 2mg PO today   (Coumadin to be given in AM as per family with breakfast )

## 2021-03-24 NOTE — PHARMACOTHERAPY INTERVENTION NOTE - COMMENTS
IV Azithromycin and ceftrixone day #3, recommended PO switch as pt qualifies and reminded to set duration of tx to max recommended duration of 7 days

## 2021-03-24 NOTE — PROGRESS NOTE ADULT - PROBLEM SELECTOR PLAN 8
Goals of care d/w health care provider - Marissa at documented # on file who stated that pt is full code and is to be intubated as her and her mother is "not ready to make the decision not to resuscitate as yet"

## 2021-03-25 LAB
ANION GAP SERPL CALC-SCNC: 9 MMOL/L — SIGNIFICANT CHANGE UP (ref 5–17)
BUN SERPL-MCNC: 22 MG/DL — HIGH (ref 7–18)
CALCIUM SERPL-MCNC: 8.4 MG/DL — SIGNIFICANT CHANGE UP (ref 8.4–10.5)
CHLORIDE SERPL-SCNC: 102 MMOL/L — SIGNIFICANT CHANGE UP (ref 96–108)
CO2 SERPL-SCNC: 25 MMOL/L — SIGNIFICANT CHANGE UP (ref 22–31)
CREAT SERPL-MCNC: 1.09 MG/DL — SIGNIFICANT CHANGE UP (ref 0.5–1.3)
GLUCOSE BLDC GLUCOMTR-MCNC: 126 MG/DL — HIGH (ref 70–99)
GLUCOSE SERPL-MCNC: 115 MG/DL — HIGH (ref 70–99)
HCT VFR BLD CALC: 38 % — LOW (ref 39–50)
HGB BLD-MCNC: 12.6 G/DL — LOW (ref 13–17)
INR BLD: 2.2 RATIO — HIGH (ref 0.88–1.16)
MCHC RBC-ENTMCNC: 27.6 PG — SIGNIFICANT CHANGE UP (ref 27–34)
MCHC RBC-ENTMCNC: 33.2 GM/DL — SIGNIFICANT CHANGE UP (ref 32–36)
MCV RBC AUTO: 83.2 FL — SIGNIFICANT CHANGE UP (ref 80–100)
NRBC # BLD: 0 /100 WBCS — SIGNIFICANT CHANGE UP (ref 0–0)
PLATELET # BLD AUTO: 263 K/UL — SIGNIFICANT CHANGE UP (ref 150–400)
POTASSIUM SERPL-MCNC: 4.4 MMOL/L — SIGNIFICANT CHANGE UP (ref 3.5–5.3)
POTASSIUM SERPL-SCNC: 4.4 MMOL/L — SIGNIFICANT CHANGE UP (ref 3.5–5.3)
PROTHROM AB SERPL-ACNC: 25.2 SEC — HIGH (ref 10.6–13.6)
RBC # BLD: 4.57 M/UL — SIGNIFICANT CHANGE UP (ref 4.2–5.8)
RBC # FLD: 14.3 % — SIGNIFICANT CHANGE UP (ref 10.3–14.5)
SODIUM SERPL-SCNC: 136 MMOL/L — SIGNIFICANT CHANGE UP (ref 135–145)
WBC # BLD: 19.3 K/UL — HIGH (ref 3.8–10.5)
WBC # FLD AUTO: 19.3 K/UL — HIGH (ref 3.8–10.5)

## 2021-03-25 PROCEDURE — 74177 CT ABD & PELVIS W/CONTRAST: CPT | Mod: 26

## 2021-03-25 RX ORDER — WARFARIN SODIUM 2.5 MG/1
2 TABLET ORAL ONCE
Refills: 0 | Status: COMPLETED | OUTPATIENT
Start: 2021-03-25 | End: 2021-03-25

## 2021-03-25 RX ORDER — LOSARTAN POTASSIUM 100 MG/1
50 TABLET, FILM COATED ORAL DAILY
Refills: 0 | Status: DISCONTINUED | OUTPATIENT
Start: 2021-03-25 | End: 2021-03-28

## 2021-03-25 RX ORDER — WARFARIN SODIUM 2.5 MG/1
2 TABLET ORAL ONCE
Refills: 0 | Status: DISCONTINUED | OUTPATIENT
Start: 2021-03-25 | End: 2021-03-25

## 2021-03-25 RX ORDER — SENNA PLUS 8.6 MG/1
2 TABLET ORAL AT BEDTIME
Refills: 0 | Status: DISCONTINUED | OUTPATIENT
Start: 2021-03-25 | End: 2021-03-27

## 2021-03-25 RX ADMIN — AZITHROMYCIN 500 MILLIGRAM(S): 500 TABLET, FILM COATED ORAL at 11:29

## 2021-03-25 RX ADMIN — SENNA PLUS 2 TABLET(S): 8.6 TABLET ORAL at 21:57

## 2021-03-25 RX ADMIN — BUDESONIDE AND FORMOTEROL FUMARATE DIHYDRATE 2 PUFF(S): 160; 4.5 AEROSOL RESPIRATORY (INHALATION) at 21:57

## 2021-03-25 RX ADMIN — BUDESONIDE AND FORMOTEROL FUMARATE DIHYDRATE 2 PUFF(S): 160; 4.5 AEROSOL RESPIRATORY (INHALATION) at 11:29

## 2021-03-25 RX ADMIN — ALBUTEROL 2 PUFF(S): 90 AEROSOL, METERED ORAL at 14:50

## 2021-03-25 RX ADMIN — WARFARIN SODIUM 2 MILLIGRAM(S): 2.5 TABLET ORAL at 21:57

## 2021-03-25 RX ADMIN — ALBUTEROL 2 PUFF(S): 90 AEROSOL, METERED ORAL at 21:57

## 2021-03-25 RX ADMIN — OXYCODONE HYDROCHLORIDE 2.5 MILLIGRAM(S): 5 TABLET ORAL at 14:12

## 2021-03-25 RX ADMIN — Medication 1 DROP(S): at 05:48

## 2021-03-25 RX ADMIN — Medication 1 DROP(S): at 00:11

## 2021-03-25 RX ADMIN — Medication 1 DROP(S): at 11:29

## 2021-03-25 RX ADMIN — Medication 40 MILLIGRAM(S): at 05:48

## 2021-03-25 RX ADMIN — Medication 0.5 MILLIGRAM(S): at 21:57

## 2021-03-25 RX ADMIN — CEFTRIAXONE 100 MILLIGRAM(S): 500 INJECTION, POWDER, FOR SOLUTION INTRAMUSCULAR; INTRAVENOUS at 13:12

## 2021-03-25 RX ADMIN — LOSARTAN POTASSIUM 50 MILLIGRAM(S): 100 TABLET, FILM COATED ORAL at 12:17

## 2021-03-25 RX ADMIN — Medication 1 DROP(S): at 23:42

## 2021-03-25 RX ADMIN — Medication 12.5 MILLIGRAM(S): at 05:48

## 2021-03-25 RX ADMIN — OXYCODONE HYDROCHLORIDE 2.5 MILLIGRAM(S): 5 TABLET ORAL at 13:12

## 2021-03-25 RX ADMIN — Medication 1 DROP(S): at 17:24

## 2021-03-25 RX ADMIN — ALBUTEROL 2 PUFF(S): 90 AEROSOL, METERED ORAL at 09:05

## 2021-03-25 NOTE — CONSULT NOTE ADULT - SUBJECTIVE AND OBJECTIVE BOX
Time of visit:    CHIEF COMPLAINT: Patient is a 88y old  Male who presents with a chief complaint of sob, cough (25 Mar 2021 12:35)      HPI:  88 yo M pt with a PMH of hard of hearing, asthma, DVT, PE x 3, HTN, afib, skin cancer, presents to the ED with complaints of worsening cough and shortness of breath. Pt states he was feeling sob for a few days. Pt is having LE pain but states that is chronic As per Daughter pt has new eye discharge for a few days as well but was unable to get him to see an eye doctor. Pt is unable to contribute any further to hx bc he wants to rest. Pt denies chest pain , abdominal pain, n/v/d or any other complaints    Off note pt 1st COVID-19 vaccine but not his 2nd as yet    GOC: full code (22 Mar 2021 16:31)   Patient seen and examined.     PAST MEDICAL & SURGICAL HISTORY:  DVT (deep venous thrombosis)    Gout    HTN (hypertension)    Skin cancer    Asthma    S/P inguinal hernia repair    S/P TURP        Allergies    No Known Allergies    Intolerances        MEDICATIONS  (STANDING):  ALBUTerol    90 MICROgram(s) HFA Inhaler 2 Puff(s) Inhalation every 6 hours  ALPRAZolam 0.5 milliGRAM(s) Oral at bedtime  artificial  tears Solution 1 Drop(s) Both EYES every 6 hours  azithromycin   Tablet 500 milliGRAM(s) Oral daily  budesonide 160 MICROgram(s)/formoterol 4.5 MICROgram(s) Inhaler 2 Puff(s) Inhalation two times a day  cefTRIAXone   IVPB 1000 milliGRAM(s) IV Intermittent every 24 hours  losartan 50 milliGRAM(s) Oral daily  methylPREDNISolone sodium succinate Injectable 40 milliGRAM(s) IV Push daily  metoprolol tartrate 12.5 milliGRAM(s) Oral two times a day  senna 2 Tablet(s) Oral at bedtime  warfarin 2 milliGRAM(s) Oral once      MEDICATIONS  (PRN):  oxyCODONE    IR 2.5 milliGRAM(s) Oral every 6 hours PRN Severe Pain (7 - 10)   Medications up to date at time of exam.    Medications up to date at time of exam.    FAMILY HISTORY:      SOCIAL HISTORY  Smoking History: [   ] smoking/smoke exposure, [ x  ] former smoker quit 30 yrs ago   Living Condition: [   ] apartment, [   ] private house  Work History:    Travel History: denies recent travel  Illicit Substance Use: denies  Alcohol Use: denies    REVIEW OF SYSTEMS:    CONSTITUTIONAL:  denies fevers, chills, sweats, weight loss    HEENT:  denies diplopia or blurred vision, sore throat or runny nose.    CARDIOVASCULAR:  denies pressure, squeezing, tightness, or heaviness about the chest; no palpitations.    RESPIRATORY:  + cough with liquid not solid     GASTROINTESTINAL:  denies abdominal pain, nausea, vomiting or diarrhea.    GENITOURINARY: denies dysuria, frequency or urgency.    NEUROLOGIC:  denies numbness, tingling, seizures or weakness.    PSYCHIATRIC:  denies disorder of thought or mood.    MSK: denies swelling, redness      PHYSICAL EXAMINATION:    GENERAL: The patient is a well-developed, well-nourished, in no apparent distress.     Vital Signs Last 24 Hrs  T(C): 36.8 (25 Mar 2021 21:02), Max: 36.8 (25 Mar 2021 05:20)  T(F): 98.3 (25 Mar 2021 21:02), Max: 98.3 (25 Mar 2021 21:02)  HR: 65 (25 Mar 2021 21:02) (63 - 77)  BP: 105/60 (25 Mar 2021 21:02) (105/60 - 170/96)  BP(mean): --  RR: 18 (25 Mar 2021 21:02) (18 - 18)  SpO2: 100% (25 Mar 2021 21:02) (98% - 100%)   (if applicable)    Chest Tube (if applicable)    HEENT: Head is normocephalic and atraumatic. Extraocular muscles are intact. Mucous membranes are moist.     NECK: Supple, no palpable adenopathy.    LUNGS: Clear to auscultation, no wheezing, rales, or rhonchi.    HEART: Regular rate and rhythm without murmur.    ABDOMEN: Soft, nontender, and nondistended.  No hepatosplenomegaly is noted.    RENAL: No difficulty voiding, no pelvic pain    EXTREMITIES: Without any cyanosis, clubbing, rash, lesions or edema.    NEUROLOGIC: Awake, alert, oriented, grossly intact    SKIN: Warm, dry, good turgor.      LABS:                        12.6   19.30 )-----------( 263      ( 25 Mar 2021 06:39 )             38.0     03-25    136  |  102  |  22<H>  ----------------------------<  115<H>  4.4   |  25  |  1.09    Ca    8.4      25 Mar 2021 06:39  Mg     2.2     03-24      PT/INR - ( 25 Mar 2021 06:39 )   PT: 25.2 sec;   INR: 2.20 ratio         PTT - ( 24 Mar 2021 11:46 )  PTT:33.5 sec                Procalcitonin, Serum: 0.06 ng/mL (03-23-21 @ 15:35)      MICROBIOLOGY: (if applicable)    RADIOLOGY & ADDITIONAL STUDIES:  EKG:   CT abd/pelvis:< from: CT Abdomen and Pelvis w/ IV Cont (03.25.21 @ 11:03) >    EXAM:  CT ABDOMEN AND PELVIS IC                            PROCEDURE DATE:  03/25/2021          INTERPRETATION:  CLINICAL INDICATION: 88 years  Male with abdominal pain    abdominal pain /  distention.    COMPARISON: Chest CT 3/22/2021    CONTRAST/COMPLICATIONS:  IV Contrast: Omnipaque 350  90 cc administered   10 cc discarded  Oral Contrast: NONE  Complications: None reported at time of study completion    PROCEDURE:  CT of the Abdomen and Pelvis was performed.  Sagittal and coronal reformats were performed.    FINDINGS:  LOWER CHEST: Trace bilateral pleural effusions. 1 cm left lower lobe nodule (2:8).    LIVER: Hepatic steatosis.  BILE DUCTS: Normal caliber.  GALLBLADDER: Cholelithiasis.  SPLEEN: Within normal limits.  PANCREAS: Within normal limits.  ADRENALS: Within normal limits.  KIDNEYS/URETERS: 3.6 cm left midpole cyst. Left lower pole renal cortical scarring. 2.3 cm right upper pole cyst. No hydroureteronephrosis. Moderate bilateral nonspecific perinephric fat stranding.    BLADDER: Within normal limits.  REPRODUCTIVE ORGANS: Unremarkable prostate. Right seminal vesicle calcifications possibly post inflammatory.    BOWEL: No bowel obstruction. Appendix is not visualized. No evidence of inflammation in the pericecal region.. Moderate fecal burden in the right colon. Left inguinal hernia containing nonobstructed sigmoid colon.  PERITONEUM: No ascites.  VESSELS: Atherosclerotic changes.  RETROPERITONEUM/LYMPH NODES: No lymphadenopathy. Mild presacral edema.  ABDOMINAL WALL: Left inguinal hernia containing nonobstructed sigmoid colon.  BONES: Degenerative changes. Moderate hypertrophic L4-5 spinal stenosis.    IMPRESSION:  Constipated right colon. Left inguinal hernia containing nonobstructed sigmoid colon.    Trace bilateral pleural effusions.    1 cm left lower lobe nodule. Please refer to recent chest CT.    L4-5 hypertrophic spinal stenosis.    Other chronic findings as above.              VIVEK HOUSTON MD; Attending Radiologist  This document has been electronically signed. Mar 25 2021 12:53PM    < end of copied text >      CTPA:< from: CT Angio Chest w/ IV Cont (03.22.21 @ 18:03) >    EXAM:  CT ANGIO CHEST (W)AW IC                            PROCEDURE DATE:  03/22/2021          INTERPRETATION:  CLINICAL INFORMATION: Cough and shortness of breath    COMPARISON: None.    CONTRAST/COMPLICATIONS:  IV Contrast: Omnipaque 350  60 cc administered   40 cc discarded  Oral Contrast: NONE  Complications: None reported at time of study completion    PROCEDURE:  CT Angiography of the Chest.  Sagittal and coronal reformats were performed as well as 3D (MIP) reconstructions.    FINDINGS:    LUNGS AND AIRWAYS: Patent central airways.  Lungs are remarkable for tiny 3 mm right upper lobe pulmonary nodule seen on image 22 of series 6. There is linear atelectasis at the right lung base. There is a left lower lobe nodule posterior and lateral ovoid in configuration measuring 1.0 x 0.9 cm. Small ill-defined nodule more superiorly in the left lower lobe measures 8 mm on image 63. Additional small old left lower lobe nodule measures 7 to 8 mm.  PLEURA: No pleural effusion.  MEDIASTINUM AND IKER: No lymphadenopathy.  VESSELS: Within normal limits.  HEART: Heart size is normal. No pericardial effusion.  CHEST WALL AND LOWER NECK: Within normal limits.  VISUALIZED UPPER ABDOMEN: Cholecystectomy. Upper pole left renal cyst    IMPRESSION: Noevidence of pulmonary embolism.  Scattered small nonspecific pulmonary nodules.  These may be followed in 3-6 months in a high risk patient.            GARCIA TRACY MD; Attending Radiologist  This document has been electronically signed. Mar 22 2021 6:52PM    < end of copied text >    ECHO:    IMPRESSION: 88y Male PAST MEDICAL & SURGICAL HISTORY:  DVT (deep venous thrombosis)    Gout    HTN (hypertension)    Skin cancer    Asthma    S/P inguinal hernia repair    S/P TURP     p/w           IMP: This is an 87 yr old man  with  hard of hearing, asthma, DVT, PE x 3, HTN, afib, skin cancer, presents to the ED with complaints of worsening cough and shortness of breath. Pt states he was feeling sob for a few days. Pt is having LE pain but states that is chronic . He also stated that he cough with drinking liquid but not eating solids  . PE neg as per CTPA.   Pat is probable aspirating which induce a cough and abd pain due to abd muscle strain from coughing /SOB        Sugg:    -S/S eval   -change solumedrol to prednisone 20 mg / day x 5 days  -continue albuterol   -asp precaution   -fall precaution   -continue anticoag   -leukocytosis is probable reactive to steroids and stress  -consider d/c antibx ( pat is afebrile and neg cultures)  -procalcitonin     d/c with NP on unit

## 2021-03-25 NOTE — PROGRESS NOTE ADULT - PROBLEM SELECTOR PLAN 9
-  DVT prophylaxis with coumadin  -  GI prophylaxis with Protonix added
-  DVT prophylaxis with coumadin  -  GI prophylaxis with Protonix added
IMPROVE VTE Individual Risk Assessment  RISK                                                         Points  [  ] Previous VTE                                      3  [  ] Thrombophilia                                   2  [  ] Lower limb paralysis                         2 (unable to hold up >15 seconds)    [  ] Current Cancer                                  2       (within 6 months)  [  ] Immobilization > 24 hrs                    1  [  ] ICU/CCU stay > 24 hrs                         1  [  ] Age > 60                                              1    cw coumadin for dvt ppx

## 2021-03-25 NOTE — PROGRESS NOTE ADULT - ASSESSMENT
86 yo M pt with a PMH of hard of hearing, asthma, DVT, PE x 3, HTN, afib, skin cancer, presents to the ED with complaints of worsening cough and shortness of breath. Pt states he was feeling sob for a few days. 
88 yo M pt with a PMH of hard of hearing, asthma, DVT, PE x 3, HTN, afib, skin cancer, presents to the ED with complaints of worsening cough and shortness of breath. Pt states he was feeling sob for a few days. 
87 year old Man with hx of hard of hearing, asthma, DVT, PE x 3, HTN, afib, skin cancer, chronic LE pain who on 3/22 presented to the ED with complaints of worsening cough and shortness of breath.

## 2021-03-25 NOTE — PROGRESS NOTE ADULT - NUTRITIONAL ASSESSMENT
Diet, DASH/TLC:   Sodium & Cholesterol Restricted (03-22-21 @ 16:40) [Active]
Diet, DASH/TLC:   Sodium & Cholesterol Restricted (03-22-21 @ 16:40) [Active]    As per daughter Paige, patient is a picky eater and she would like to speak to a dietician regarding patient's meals.   Dietary consulted.

## 2021-03-25 NOTE — PROGRESS NOTE ADULT - PROBLEM SELECTOR PLAN 7
-  chronic afib on coumadin 2 mg qd  -  metoprolol 12.5mg  daily  -  monitor INR daily and dose coumadin per INR  -  INR 2.20 today.   -  Coumadin 2mg PO today   (Coumadin to be given in AM as per family with breakfast )

## 2021-03-25 NOTE — CHART NOTE - NSCHARTNOTEFT_GEN_A_CORE
Patient with generalized muscle weakness due to his Asthma and Hypertension.  He is now unable to safely ambulate with a walker, cane or crutches.   He will require a wheelchair to access the bathroom for toileting and the dining facilities within his residence.  He is agreeable to the wheelchair and has a 24hr assist with the wheelchair.

## 2021-03-26 ENCOUNTER — TRANSCRIPTION ENCOUNTER (OUTPATIENT)
Age: 86
End: 2021-03-26

## 2021-03-26 DIAGNOSIS — Z02.9 ENCOUNTER FOR ADMINISTRATIVE EXAMINATIONS, UNSPECIFIED: ICD-10-CM

## 2021-03-26 LAB
ANION GAP SERPL CALC-SCNC: 10 MMOL/L — SIGNIFICANT CHANGE UP (ref 5–17)
APTT BLD: 28.9 SEC — SIGNIFICANT CHANGE UP (ref 27.5–35.5)
BUN SERPL-MCNC: 26 MG/DL — HIGH (ref 7–18)
CALCIUM SERPL-MCNC: 8.3 MG/DL — LOW (ref 8.4–10.5)
CHLORIDE SERPL-SCNC: 99 MMOL/L — SIGNIFICANT CHANGE UP (ref 96–108)
CO2 SERPL-SCNC: 24 MMOL/L — SIGNIFICANT CHANGE UP (ref 22–31)
CREAT SERPL-MCNC: 1.27 MG/DL — SIGNIFICANT CHANGE UP (ref 0.5–1.3)
GLUCOSE SERPL-MCNC: 116 MG/DL — HIGH (ref 70–99)
HCT VFR BLD CALC: 38.8 % — LOW (ref 39–50)
HGB BLD-MCNC: 13.1 G/DL — SIGNIFICANT CHANGE UP (ref 13–17)
INR BLD: 2.24 RATIO — HIGH (ref 0.88–1.16)
MCHC RBC-ENTMCNC: 27.9 PG — SIGNIFICANT CHANGE UP (ref 27–34)
MCHC RBC-ENTMCNC: 33.8 GM/DL — SIGNIFICANT CHANGE UP (ref 32–36)
MCV RBC AUTO: 82.7 FL — SIGNIFICANT CHANGE UP (ref 80–100)
NRBC # BLD: 0 /100 WBCS — SIGNIFICANT CHANGE UP (ref 0–0)
PLATELET # BLD AUTO: 274 K/UL — SIGNIFICANT CHANGE UP (ref 150–400)
POTASSIUM SERPL-MCNC: 4 MMOL/L — SIGNIFICANT CHANGE UP (ref 3.5–5.3)
POTASSIUM SERPL-SCNC: 4 MMOL/L — SIGNIFICANT CHANGE UP (ref 3.5–5.3)
PROCALCITONIN SERPL-MCNC: 0.05 NG/ML — SIGNIFICANT CHANGE UP (ref 0.02–0.1)
PROTHROM AB SERPL-ACNC: 25.7 SEC — HIGH (ref 10.6–13.6)
RBC # BLD: 4.69 M/UL — SIGNIFICANT CHANGE UP (ref 4.2–5.8)
RBC # FLD: 14.2 % — SIGNIFICANT CHANGE UP (ref 10.3–14.5)
SODIUM SERPL-SCNC: 133 MMOL/L — LOW (ref 135–145)
WBC # BLD: 18.97 K/UL — HIGH (ref 3.8–10.5)
WBC # FLD AUTO: 18.97 K/UL — HIGH (ref 3.8–10.5)

## 2021-03-26 RX ORDER — POLYETHYLENE GLYCOL 3350 17 G/17G
17 POWDER, FOR SOLUTION ORAL DAILY
Refills: 0 | Status: DISCONTINUED | OUTPATIENT
Start: 2021-03-26 | End: 2021-03-28

## 2021-03-26 RX ORDER — WARFARIN SODIUM 2.5 MG/1
2 TABLET ORAL EVERY 24 HOURS
Refills: 0 | Status: DISCONTINUED | OUTPATIENT
Start: 2021-03-26 | End: 2021-03-28

## 2021-03-26 RX ADMIN — ALBUTEROL 2 PUFF(S): 90 AEROSOL, METERED ORAL at 21:55

## 2021-03-26 RX ADMIN — AZITHROMYCIN 500 MILLIGRAM(S): 500 TABLET, FILM COATED ORAL at 11:16

## 2021-03-26 RX ADMIN — ALBUTEROL 2 PUFF(S): 90 AEROSOL, METERED ORAL at 09:43

## 2021-03-26 RX ADMIN — Medication 12.5 MILLIGRAM(S): at 05:22

## 2021-03-26 RX ADMIN — CEFTRIAXONE 100 MILLIGRAM(S): 500 INJECTION, POWDER, FOR SOLUTION INTRAMUSCULAR; INTRAVENOUS at 12:40

## 2021-03-26 RX ADMIN — BUDESONIDE AND FORMOTEROL FUMARATE DIHYDRATE 2 PUFF(S): 160; 4.5 AEROSOL RESPIRATORY (INHALATION) at 21:50

## 2021-03-26 RX ADMIN — Medication 1 DROP(S): at 17:26

## 2021-03-26 RX ADMIN — Medication 1 DROP(S): at 22:58

## 2021-03-26 RX ADMIN — Medication 1 DROP(S): at 05:22

## 2021-03-26 RX ADMIN — WARFARIN SODIUM 2 MILLIGRAM(S): 2.5 TABLET ORAL at 19:00

## 2021-03-26 RX ADMIN — LOSARTAN POTASSIUM 50 MILLIGRAM(S): 100 TABLET, FILM COATED ORAL at 05:22

## 2021-03-26 RX ADMIN — Medication 5 MILLIGRAM(S): at 01:02

## 2021-03-26 RX ADMIN — Medication 1 DROP(S): at 11:13

## 2021-03-26 RX ADMIN — Medication 0.5 MILLIGRAM(S): at 21:55

## 2021-03-26 RX ADMIN — POLYETHYLENE GLYCOL 3350 17 GRAM(S): 17 POWDER, FOR SOLUTION ORAL at 11:15

## 2021-03-26 RX ADMIN — BUDESONIDE AND FORMOTEROL FUMARATE DIHYDRATE 2 PUFF(S): 160; 4.5 AEROSOL RESPIRATORY (INHALATION) at 09:43

## 2021-03-26 RX ADMIN — Medication 20 MILLIGRAM(S): at 05:22

## 2021-03-26 RX ADMIN — Medication 12.5 MILLIGRAM(S): at 17:26

## 2021-03-26 NOTE — PROGRESS NOTE ADULT - PROBLEM SELECTOR PLAN 2
hx of asthma, Pulm embolism   wheezing on exam  was dosed last night with solumedrol for increase wheezing  CTA negative for PE  INR therapeutic, con't coumadin   BNP >2588  CXR negative for pulm edema / effusions;  TTE ordered on adm - pending   con't solu medrol 40 iv qd, albuterol q6h and symbicort inhalers
-  hx of asthma, Pulm embolism   -  CTA negative for PE  -  BNP >2588  -  CXR negative for pulm edema / effusions;  - TTE ordered on adm - pending   -  continue with  solumedrol 40 IV daily  -  albuterol q6h PRN  -  symbicort daily
CTA noted above   Continue with prednisone  Continue with -  albuterol q6h PRN  -  symbicort daily
-  hx of asthma, Pulm embolism   -  CTA negative for PE  -  BNP >2588  -  CXR negative for pulm edema / effusions;  - TTE ordered on adm - pending   -  continue with  solumedrol 40 IV daily  -  albuterol q6h PRN  -  symbicort daily

## 2021-03-26 NOTE — SWALLOW BEDSIDE ASSESSMENT ADULT - SWALLOW EVAL: DIAGNOSIS
Oropharyngeal swallow was intact and timely with no overt s/s of laryngeal penetration or aspiration at this exam; however, pt reported he feels like he has pharyngeal residue after swallowing solids. Pt was instructed/educated on how/when to use a chin tuck, take multiple swallows per bolus and frequent liquid washes throughout mealtime.

## 2021-03-26 NOTE — PHYSICAL THERAPY INITIAL EVALUATION ADULT - PERTINENT HX OF CURRENT PROBLEM, REHAB EVAL
Patient admitted from home due to worsening SOB and cough. Patient w/ h/o Asthma, DVT and PEx 3, HTN

## 2021-03-26 NOTE — PROGRESS NOTE ADULT - PROBLEM SELECTOR PLAN 7
Pt to be medically stable for discharge s/p course IV abx  Per pt daughter wheelchair has been delivered to home

## 2021-03-26 NOTE — SWALLOW BEDSIDE ASSESSMENT ADULT - COMMENTS
Pt received with HOB elevated to 90°. AA+Ox3. Pt was educated on how to perform a chin tuck when consuming solids and to take multiple swallows per bolus, as well as liquid wash to clear oral/pharyngeal residue.

## 2021-03-26 NOTE — PROGRESS NOTE ADULT - PROBLEM SELECTOR PLAN 4
Na++ labile  monitor BMP
-  Na 132 today from 133   -  monitor BMP daily
-  Na 136   -  monitor BMP daily
Continue with Metoprolol  Continue with Losartan 50mg (half dose home regimen 100mg)  Continue to monitor BP

## 2021-03-26 NOTE — DISCHARGE NOTE PROVIDER - CARE PROVIDER_API CALL
Yue Kolb)  Internal Medicine  [address] [place] [address]  Phone: [phone]  Fax: [phone]  Follow Up Time: 1 week

## 2021-03-26 NOTE — DISCHARGE NOTE PROVIDER - HOSPITAL COURSE
This is an 87 yr old man  with  hard of hearing, asthma, DVT, Pulmonary Embolus x 3, HTN, afib, skin cancer, admitted 3/22/21 with complaints of worsening cough and shortness of breath x several days.   He also stated that he cough with drinking liquid but not eating solids.  CT angiogram was negative for PE.  He had a pulmonary consult, and a swallow evaluation was recommended to r/o swallowing difficulties that may lead to aspiration. Swallow evaluation was done on 3/26/21 and his swallow function was evaluated as intact. Basic chin tuck education was provided.  It was recommended that he allow for swallow between intakes; alternate food with liquid; maintain upright posture during/after eating for 30 mins; oral hygiene; position upright (90 degrees);  Multiple swallows per bolus + Liquid wash after solid.     He was also treated for pneumonia with Azithromycin, Rocephin and steroids.  He is feeling better and his WBC count is trending down.  He was medically cleared for discharge with recommendation to take Ceftin x 1 day and complete steroid course (2 days lef)/  These prescriptions were sent electronicaly to his pharmacy.  He will also be given a written script for a nebulizer.     This is an 87 yr old man  with  hard of hearing, asthma, DVT, Pulmonary Embolus x 3, HTN, afib, skin cancer, admitted 3/22/21 with complaints of worsening cough and shortness of breath x several days.   He also stated that he cough with drinking liquid but not eating solids.  CT angiogram was negative for PE.  He had a pulmonary consult, and a swallow evaluation was recommended to r/o swallowing difficulties that may lead to aspiration. Swallow evaluation was done on 3/26/21 and his swallow function was evaluated as intact. Basic chin tuck education was provided.  It was recommended that he allow for swallow between intakes; alternate food with liquid; maintain upright posture during/after eating for 30 mins; oral hygiene; position upright (90 degrees);  Multiple swallows per bolus + Liquid wash after solid.     He was also treated for pneumonia with Azithromycin, Rocephin and steroids.  He is feeling better and his WBC count is trending down.  He was medically cleared for discharge with recommendation to take Ceftin x 1 day and complete steroid course (2 days left).  These prescriptions were sent electronically to his pharmacy.  He will also be given a written script for a nebulizer.

## 2021-03-26 NOTE — DIETITIAN INITIAL EVALUATION ADULT. - PROBLEM SELECTOR PLAN 1
Pt presented with cough sob x1 week approx    ED vitals Temp 101F, wbc 13k , source likely viral infection   s/p rocephin and IVF bolus in ed   COVID and UA negative   Pending RVP  will start on empiric antibiotics Zithro and rocephin   fu urine and blood cultures   fu procal, esr, crp, legionella

## 2021-03-26 NOTE — PHYSICAL THERAPY INITIAL EVALUATION ADULT - DIAGNOSIS, PT EVAL
Patient presents w/ decreased endurance; unsteady gait and balance that inteferes with his ADL's, and gait.

## 2021-03-26 NOTE — CHART NOTE - NSCHARTNOTEFT_GEN_A_CORE
EVENT: CT abd/pelvis resulted constipated    Brief HPI   86 yo M pt with a PMH of hard of hearing, asthma, DVT, PE x 3, HTN, afib, skin cancer, presented to the ED with complaints of worsening cough and shortness of breath. Pt states he was feeling sob for a few days. Patient admitted for Sepsis (fever/chills) source to be determine.   3/25 -: CT Abdomen and Pelvis w/ IV Cont (03.25.21 @ 11:03) > resulted Constipated right colon. Staff report last BM yesterday, patient on opoid for pain management, not on any bowel regimen. No c/o abd pain or discomfort.   Also pulm recommendation to change solumedrol to prednisone 20 mg / day x 5 days.     OBJECTIVE:  Vital Signs Last 24 Hrs  T(C): 36.8 (25 Mar 2021 21:02), Max: 36.8 (25 Mar 2021 05:20)  T(F): 98.3 (25 Mar 2021 21:02), Max: 98.3 (25 Mar 2021 21:02)  HR: 65 (25 Mar 2021 21:02) (63 - 77)  BP: 105/60 (25 Mar 2021 21:02) (105/60 - 170/96)  BP(mean): --  RR: 18 (25 Mar 2021 21:02) (18 - 18)  SpO2: 100% (25 Mar 2021 21:02) (98% - 100%)    LABS:                        12.6   19.30 )-----------( 263      ( 25 Mar 2021 06:39 )             38.0     03-25    136  |  102  |  22<H>  ----------------------------<  115<H>  4.4   |  25  |  1.09    Ca    8.4      25 Mar 2021 06:39  Mg     2.2     03-24    < from: CT Abdomen and Pelvis w/ IV Cont (03.25.21 @ 11:03) >    Constipated right colon. Left inguinal hernia containing nonobstructed sigmoid colon.    PROBLEM: Constipation as per CT abd/pelvis  PLAN:  1. Continue senna 2 Tablet(s) Oral at bedtime  2. Bisacodyl 5 milliGRAM(s) Oral every 12 hours PRN Constipation ordered, 1 dose now  3. Miralax daily ordered  4. Encouraged PO hydration    PROBLEM: Shortness of breath due to  hx of asthma  1. Solumedrol change to prednisone 20 mg / day x 5 days as per pulm recommendation  2. Albuterol q6h PRN  3. Symbicort daily.

## 2021-03-26 NOTE — DISCHARGE NOTE PROVIDER - NSDCCPGOAL_GEN_ALL_CORE_FT
To get better and follow your care plan as instructed. Free from signs and symptoms of infection.

## 2021-03-26 NOTE — PROGRESS NOTE ADULT - PROBLEM SELECTOR PLAN 5
Rate controlled  Continue with Coumadin 2 mg and Metoprolol 12.5 mg  Follow up INR in AM
con't home meds
-  Continue with Metoprolol
-  Continue with Metoprolol  -  Losartan initiated at 50mg,  half dose home regimen 100mg  -  Monitor blood pressures

## 2021-03-26 NOTE — DISCHARGE NOTE PROVIDER - NSDCMRMEDTOKEN_GEN_ALL_CORE_FT
ALPRAZolam 0.5 mg oral tablet: 1 tab(s) orally once a day (at bedtime)  Coumadin 2 mg oral tablet: 1 tab(s) orally once a day (at bedtime)  losartan 100 mg oral tablet: 1 tab(s) orally once a day  metoprolol succinate 25 mg oral tablet, extended release: 1 tab(s) orally once a day   ALPRAZolam 0.5 mg oral tablet: 1 tab(s) orally once a day (at bedtime)  Coumadin 2 mg oral tablet: 1 tab(s) orally once a day (at bedtime)  metoprolol succinate 25 mg oral tablet, extended release: 1 tab(s) orally once a day   albuterol 5 mg/mL (0.5%) inhalation solution: 1 milliliter(s) by nebulizer 2 times a day   albuterol 90 mcg/inh inhalation aerosol: 2 puff(s) inhaled every 6 hours  ALPRAZolam 0.5 mg oral tablet: 1 tab(s) orally once a day (at bedtime)  cefuroxime 250 mg oral tablet: 1 tab(s) orally 2 times a day   Coumadin 2 mg oral tablet: 1 tab(s) orally once a day (at bedtime)  metoprolol succinate 25 mg oral tablet, extended release: 1 tab(s) orally once a day  predniSONE 20 mg oral tablet: 1 tab(s) orally once a day  Symbicort 160 mcg-4.5 mcg/inh inhalation aerosol: 1 puff(s) inhaled once a day    albuterol 2.5 mg/3 mL (0.083%) inhalation solution: 2.5 milligram(s) by nebulizer 2 times a day MDD:2  albuterol 90 mcg/inh inhalation aerosol: 2 puff(s) inhaled every 6 hours  ALPRAZolam 0.5 mg oral tablet: 1 tab(s) orally once a day (at bedtime)  cefuroxime 250 mg oral tablet: 1 tab(s) orally 2 times a day   Coumadin 2 mg oral tablet: 1 tab(s) orally once a day (at bedtime)  metoprolol succinate 25 mg oral tablet, extended release: 1 tab(s) orally once a day  predniSONE 20 mg oral tablet: 1 tab(s) orally once a day  Symbicort 160 mcg-4.5 mcg/inh inhalation aerosol: 1 puff(s) inhaled once a day

## 2021-03-26 NOTE — DISCHARGE NOTE PROVIDER - INSTRUCTIONS
Allow for swallow between intakes; alternate food with liquid; maintain upright posture during/after eating for 30 mins; oral hygiene; position upright (90 degrees);  Multiple swallows per bolus + Liquid wash after solid.

## 2021-03-26 NOTE — DISCHARGE NOTE PROVIDER - NSDCCPCAREPLAN_GEN_ALL_CORE_FT
PRINCIPAL DISCHARGE DIAGNOSIS  Diagnosis: PNA (pneumonia)  Assessment and Plan of Treatment: Complete Ceftin as prescribed.  Complete prednisone as prescribed. Prescriptions have been sent to your pharmacy.  Goal is to  have complete recovery of blood counts, free from signs and symptoms of infection        SECONDARY DISCHARGE DIAGNOSES  Diagnosis: Afib  Assessment and Plan of Treatment: Afib: Continue coumadin and monitor PT/INR as usual.  Atrial fibrillation is the most common heart rhythm problem & has the risk of stroke & heart attack  It helps if you control your blood pressure, not drink more than 1-2 alcohol drinks per day, cut down on caffeine, getting treatment for over active thyroid gland, & getting exercise  Call your doctor if you feel your heart racing or beating unusually, chest tightness or pain, lightheaded, faint, shortness of breath especially with exercise  It is important to take your heart medication as prescribed  You may be on anticoagulation which is very important to take as directed - you may need blood work to monitor drug levels      Diagnosis: HTN (hypertension)  Assessment and Plan of Treatment: HTN (hypertension): Continue home medications.  Follow up with your medical doctor to establish long term blood pressure treatment goals.

## 2021-03-26 NOTE — DIETITIAN INITIAL EVALUATION ADULT. - PROBLEM SELECTOR PLAN 2
Pt with hx of asthma, Pulm emobolism   wheezing on P. exam   will order CTA to rule out PE   wells score 4.5; moderate risk for PE   INR therapeutic, will cw coumadin   BNP > 2k; cxr negative for pulm edema or effusions; will order echo   will start on solu medrol 40 iv qd, albuterol q6h and symbicort inhalers for now

## 2021-03-26 NOTE — PROGRESS NOTE ADULT - PROBLEM SELECTOR PLAN 6
mildly elevated lft   f/u  hepatitis panel    Hepatic US ordered on admission pending
Continue with coumadin
-  mildly elevated lft   -  f/u  hepatitis panel   -  Hepatic US ordered on admission pending
-  mildly elevated lft   -  f/u  hepatitis panel   -  Hepatic US   < from: US Hepatic & Pancreatic (03.24.21 @ 12:58) >  IMPRESSION: Unremarkable study. Status post cholecystectomy. Right renal cyst

## 2021-03-26 NOTE — DIETITIAN INITIAL EVALUATION ADULT. - PERTINENT LABORATORY DATA
03-26 Na133 mmol/L<L> Glu 116 mg/dL<H> K+ 4.0 mmol/L Cr  1.27 mg/dL BUN 26 mg/dL<H>   03-23 Phos 2.9 mg/dL   03-22 Alb 3.1 g/dL<L>     03-23 Chol 125 mg/dL LDL --    HDL 40 mg/dL<L> Trig 39 mg/dL    03-23-21 @ 15:25 HgbA1C 5.8

## 2021-03-26 NOTE — DIETITIAN INITIAL EVALUATION ADULT. - PROBLEM SELECTOR PLAN 4
Pt presented with hyponatremia 133   fu urine lytes, pt rcv'd IV bolus in ed   will check  for legionella,  will fu am bmp

## 2021-03-26 NOTE — SWALLOW BEDSIDE ASSESSMENT ADULT - SLP PERTINENT HISTORY OF CURRENT PROBLEM
88 y/o M. PMHx Telida, asthma, DVT, PE x 3, HTN, afib, skin cancer. Reportedly presented to ED with c/o worsening cough/SOB. Pt reported stated he was feeling SOB for a few days and is having chronic LE pain. Daughter reportedly stated pt has new eye discharge for a few days but was unable to get him to see an eye doctor. Pt reportedly denied chest pain , abdominal pain, n/v/d or any other complaints.

## 2021-03-26 NOTE — PROGRESS NOTE ADULT - PROBLEM SELECTOR PLAN 1
CXR noted above  Workup grossly negative  WBC 18.97 (downtrending)  Afebrile  Continue with Rocephin (day 5/7) and Azithromycin (day 5/5)
likely viral infection   slight downtrend in WBC today  s/p rocephin and IVF bolus in ed   COVID, RVP, and UA negative   on empiric antibiotics Zithro and rocephin    blood cultures results pending  sed rate 33  fu procal, crp, legionella    ongoing eval for presenting symptoms
-  WBC down  to 19k today,  from 22k  -  Continue with Azithromycin PO  -  COVID, RVP, and UA negative   -  blood cultures no growth to date  -  CT of abdomen completed for increasing abdominal pain;  results pending
-  WBC increased to 22k today,  from 12  -  Continue with Azithromycin,  changed to PO today, as per Pharmacy recc  -  COVID, RVP, and UA negative   -  blood cultures results pending  -  CT of abdomen ordered for increasing abdominal pain

## 2021-03-26 NOTE — SWALLOW BEDSIDE ASSESSMENT ADULT - SWALLOW EVAL: RECOMMENDED FEEDING/EATING TECHNIQUES
Chin Tuck + Multiple swallows per bolus + Liquid wash after solids/allow for swallow between intakes/alternate food with liquid/maintain upright posture during/after eating for 30 mins/oral hygiene/position upright (90 degrees)/tuck chin

## 2021-03-26 NOTE — DIETITIAN INITIAL EVALUATION ADULT. - PROBLEM SELECTOR PLAN 7
pt is on coumadin 2 mg qd, metoprolol suc 25 qd  will cw lopressor 12.5 bid and coumadin   fu INR QD

## 2021-03-26 NOTE — DIETITIAN INITIAL EVALUATION ADULT. - OTHER INFO
Patient from home lives with family. Visited pt. alert but weak, reports "eats small meals" at home, wife & daughter "takes care of him", denies nausea/vomiting or diarrhea, has upper dentures & states "some difficulty with swallowing" if consumes large portion on meals? Speech/Swallow team consulted. Per pt.  Lbs & has gained weight ~15 Lbs? dosing wt. 160 Lbs noted. Presently pt. consuming ~40-50% of meals, obtained food preferences, updated/kitchen, willing to have nutrition supplement, encourage po intake with meal set up, receiving laxative, no edema, skin intact.

## 2021-03-26 NOTE — PHYSICAL THERAPY INITIAL EVALUATION ADULT - GENERAL OBSERVATIONS, REHAB EVAL
Patient received in supine; AxOX3; reports no pain at this time; states wants to go home. Patient w/ IV line.

## 2021-03-26 NOTE — DIETITIAN INITIAL EVALUATION ADULT. - PROBLEM SELECTOR PLAN 5
Pt at home on losartan and metoprolol suc 25 qd   will hold losartan in the setting of sepsis   will cw lopressor 12.5 bid

## 2021-03-26 NOTE — PROGRESS NOTE ADULT - ATTENDING COMMENTS
Patient seen and examined.     T(C): 36.3 (03-26-21 @ 20:28), Max: 36.9 (03-26-21 @ 14:00)  HR: 65 (03-26-21 @ 20:28) (65 - 80)  BP: 155/82 (03-26-21 @ 20:28) (124/86 - 155/82)  RR: 17 (03-26-21 @ 20:28) (17 - 18)  SpO2: 98% (03-26-21 @ 20:28) (98% - 99%)    Reviewed labs and imaging.       Pneumonia  Sepsis  Asthma exacerbation  PE   A.fib   HTN       iv Ceftriaxone and Zithromax, nebs, Solumedrol iv.   Continue with Coumadin.  Monitor PT/INR.    PT .
86 yo M pt with a PMH of hard of hearing, asthma, DVT, PE x 3, HTN, afib, skin cancer, presents to the ED with complaints of worsening cough and shortness of breath.    T(C): 36.9 (03-23-21 @ 20:51), Max: 36.9 (03-23-21 @ 20:51)  HR: 81 (03-23-21 @ 20:51) (76 - 82)  BP: 139/71 (03-23-21 @ 20:51) (112/73 - 139/71)  RR: 19 (03-23-21 @ 20:51) (19 - 20)  SpO2: 100% (03-23-21 @ 20:51) (99% - 100%)      MEDICATIONS  (STANDING):  ALBUTerol    90 MICROgram(s) HFA Inhaler 2 Puff(s) Inhalation every 6 hours  ALPRAZolam 0.5 milliGRAM(s) Oral at bedtime  azithromycin  IVPB 500 milliGRAM(s) IV Intermittent every 24 hours  budesonide 160 MICROgram(s)/formoterol 4.5 MICROgram(s) Inhaler 2 Puff(s) Inhalation two times a day  cefTRIAXone   IVPB 1000 milliGRAM(s) IV Intermittent every 24 hours  methylPREDNISolone sodium succinate Injectable 40 milliGRAM(s) IV Push daily  metoprolol tartrate 12.5 milliGRAM(s) Oral two times a day  warfarin 2 milliGRAM(s) Oral once    MEDICATIONS  (PRN):      Reviewed labs and imaging.       Pneumonia  Sepsis  Asthma exacerbation  PE   A.fib   HTN       iv Ceftriaxone and Zithromax, nebs, Solumedrol iv.   Continue with Coumadin.  Monitor PT/INR.    Pulm consult.
Patient seen and examined.     T(C): 36.8 (03-25-21 @ 21:02), Max: 36.8 (03-25-21 @ 11:19)  HR: 65 (03-25-21 @ 21:02) (65 - 77)  BP: 105/60 (03-25-21 @ 21:02) (105/60 - 170/96)  RR: 18 (03-25-21 @ 21:02) (18 - 18)  SpO2: 100% (03-25-21 @ 21:02) (98% - 100%)    Reviewed labs and imaging.       Pneumonia  Sepsis  Asthma exacerbation  PE   A.fib   HTN       iv Ceftriaxone and Zithromax, nebs, Solumedrol iv.   Continue with Coumadin.  Monitor PT/INR.    PT .
Patient seen and examined.     T(C): 36.2 (03-24-21 @ 20:14), Max: 36.2 (03-24-21 @ 20:14)  HR: 75 (03-24-21 @ 20:14) (72 - 79)  BP: 139/81 (03-24-21 @ 20:14) (137/77 - 144/70)  RR: 18 (03-24-21 @ 20:14) (18 - 18)  SpO2: 95% (03-24-21 @ 20:14) (95% - 100%)      Reviewed labs and imaging.       Pneumonia  Sepsis  Asthma exacerbation  PE   A.fib   HTN       iv Ceftriaxone and Zithromax, nebs, Solumedrol iv.   Continue with Coumadin.  Monitor PT/INR.    PT

## 2021-03-27 LAB
ANION GAP SERPL CALC-SCNC: 6 MMOL/L — SIGNIFICANT CHANGE UP (ref 5–17)
APTT BLD: 30.9 SEC — SIGNIFICANT CHANGE UP (ref 27.5–35.5)
BUN SERPL-MCNC: 23 MG/DL — HIGH (ref 7–18)
CALCIUM SERPL-MCNC: 8.2 MG/DL — LOW (ref 8.4–10.5)
CHLORIDE SERPL-SCNC: 103 MMOL/L — SIGNIFICANT CHANGE UP (ref 96–108)
CO2 SERPL-SCNC: 27 MMOL/L — SIGNIFICANT CHANGE UP (ref 22–31)
CREAT SERPL-MCNC: 1.14 MG/DL — SIGNIFICANT CHANGE UP (ref 0.5–1.3)
CULTURE RESULTS: SIGNIFICANT CHANGE UP
CULTURE RESULTS: SIGNIFICANT CHANGE UP
GLUCOSE SERPL-MCNC: 96 MG/DL — SIGNIFICANT CHANGE UP (ref 70–99)
HCT VFR BLD CALC: 37.8 % — LOW (ref 39–50)
HGB BLD-MCNC: 12.6 G/DL — LOW (ref 13–17)
INR BLD: 2.76 RATIO — HIGH (ref 0.88–1.16)
MAGNESIUM SERPL-MCNC: 2.4 MG/DL — SIGNIFICANT CHANGE UP (ref 1.6–2.6)
MCHC RBC-ENTMCNC: 28.2 PG — SIGNIFICANT CHANGE UP (ref 27–34)
MCHC RBC-ENTMCNC: 33.3 GM/DL — SIGNIFICANT CHANGE UP (ref 32–36)
MCV RBC AUTO: 84.6 FL — SIGNIFICANT CHANGE UP (ref 80–100)
NRBC # BLD: 0 /100 WBCS — SIGNIFICANT CHANGE UP (ref 0–0)
PHOSPHATE SERPL-MCNC: 3 MG/DL — SIGNIFICANT CHANGE UP (ref 2.5–4.5)
PLATELET # BLD AUTO: 248 K/UL — SIGNIFICANT CHANGE UP (ref 150–400)
POTASSIUM SERPL-MCNC: 4.2 MMOL/L — SIGNIFICANT CHANGE UP (ref 3.5–5.3)
POTASSIUM SERPL-SCNC: 4.2 MMOL/L — SIGNIFICANT CHANGE UP (ref 3.5–5.3)
PROTHROM AB SERPL-ACNC: 31.3 SEC — HIGH (ref 10.6–13.6)
RBC # BLD: 4.47 M/UL — SIGNIFICANT CHANGE UP (ref 4.2–5.8)
RBC # FLD: 14.6 % — HIGH (ref 10.3–14.5)
SODIUM SERPL-SCNC: 136 MMOL/L — SIGNIFICANT CHANGE UP (ref 135–145)
SPECIMEN SOURCE: SIGNIFICANT CHANGE UP
SPECIMEN SOURCE: SIGNIFICANT CHANGE UP
WBC # BLD: 15.29 K/UL — HIGH (ref 3.8–10.5)
WBC # FLD AUTO: 15.29 K/UL — HIGH (ref 3.8–10.5)

## 2021-03-27 RX ORDER — MAGNESIUM HYDROXIDE 400 MG/1
30 TABLET, CHEWABLE ORAL AT BEDTIME
Refills: 0 | Status: DISCONTINUED | OUTPATIENT
Start: 2021-03-27 | End: 2021-03-28

## 2021-03-27 RX ADMIN — Medication 20 MILLIGRAM(S): at 05:28

## 2021-03-27 RX ADMIN — Medication 1 DROP(S): at 05:27

## 2021-03-27 RX ADMIN — Medication 1 DROP(S): at 12:06

## 2021-03-27 RX ADMIN — BUDESONIDE AND FORMOTEROL FUMARATE DIHYDRATE 2 PUFF(S): 160; 4.5 AEROSOL RESPIRATORY (INHALATION) at 10:03

## 2021-03-27 RX ADMIN — Medication 1 DROP(S): at 17:26

## 2021-03-27 RX ADMIN — Medication 12.5 MILLIGRAM(S): at 05:27

## 2021-03-27 RX ADMIN — ALBUTEROL 2 PUFF(S): 90 AEROSOL, METERED ORAL at 21:23

## 2021-03-27 RX ADMIN — WARFARIN SODIUM 2 MILLIGRAM(S): 2.5 TABLET ORAL at 17:26

## 2021-03-27 RX ADMIN — BUDESONIDE AND FORMOTEROL FUMARATE DIHYDRATE 2 PUFF(S): 160; 4.5 AEROSOL RESPIRATORY (INHALATION) at 21:24

## 2021-03-27 RX ADMIN — ALBUTEROL 2 PUFF(S): 90 AEROSOL, METERED ORAL at 16:19

## 2021-03-27 RX ADMIN — LOSARTAN POTASSIUM 50 MILLIGRAM(S): 100 TABLET, FILM COATED ORAL at 05:27

## 2021-03-27 RX ADMIN — ALBUTEROL 2 PUFF(S): 90 AEROSOL, METERED ORAL at 10:03

## 2021-03-27 RX ADMIN — CEFTRIAXONE 100 MILLIGRAM(S): 500 INJECTION, POWDER, FOR SOLUTION INTRAMUSCULAR; INTRAVENOUS at 12:36

## 2021-03-27 RX ADMIN — Medication 0.5 MILLIGRAM(S): at 21:23

## 2021-03-27 RX ADMIN — ALBUTEROL 2 PUFF(S): 90 AEROSOL, METERED ORAL at 04:21

## 2021-03-27 RX ADMIN — MAGNESIUM HYDROXIDE 30 MILLILITER(S): 400 TABLET, CHEWABLE ORAL at 17:26

## 2021-03-27 RX ADMIN — AZITHROMYCIN 500 MILLIGRAM(S): 500 TABLET, FILM COATED ORAL at 12:06

## 2021-03-27 RX ADMIN — Medication 12.5 MILLIGRAM(S): at 17:26

## 2021-03-27 RX ADMIN — MAGNESIUM HYDROXIDE 30 MILLILITER(S): 400 TABLET, CHEWABLE ORAL at 21:25

## 2021-03-27 NOTE — PROGRESS NOTE ADULT - SUBJECTIVE AND OBJECTIVE BOX
Patient is a 88y old  Male who presents with a chief complaint of sob, cough (26 Mar 2021 17:41)    PATIENT IS SEEN AND EXAMINED IN MEDICAL FLOOR.      ALLERGIES:  No Known Allergies      VITALS:    Vital Signs Last 24 Hrs  T(C): 36.4 (27 Mar 2021 13:27), Max: 36.4 (27 Mar 2021 05:54)  T(F): 97.6 (27 Mar 2021 13:27), Max: 97.6 (27 Mar 2021 13:27)  HR: 83 (27 Mar 2021 13:27) (65 - 83)  BP: 140/94 (27 Mar 2021 13:27) (140/94 - 155/82)  BP(mean): --  RR: 20 (27 Mar 2021 13:27) (17 - 20)  SpO2: 99% (27 Mar 2021 13:27) (98% - 99%)    LABS:    CBC Full  -  ( 27 Mar 2021 06:35 )  WBC Count : 15.29 K/uL  RBC Count : 4.47 M/uL  Hemoglobin : 12.6 g/dL  Hematocrit : 37.8 %  Platelet Count - Automated : 248 K/uL  Mean Cell Volume : 84.6 fl  Mean Cell Hemoglobin : 28.2 pg  Mean Cell Hemoglobin Concentration : 33.3 gm/dL  Auto Neutrophil # : x  Auto Lymphocyte # : x  Auto Monocyte # : x  Auto Eosinophil # : x  Auto Basophil # : x  Auto Neutrophil % : x  Auto Lymphocyte % : x  Auto Monocyte % : x  Auto Eosinophil % : x  Auto Basophil % : x    PT/INR - ( 27 Mar 2021 06:35 )   PT: 31.3 sec;   INR: 2.76 ratio         PTT - ( 27 Mar 2021 06:35 )  PTT:30.9 sec  03-27    136  |  103  |  23<H>  ----------------------------<  96  4.2   |  27  |  1.14    Ca    8.2<L>      27 Mar 2021 06:35  Phos  3.0     03-27  Mg     2.4     03-27      Creatinine Trend: 1.14<--, 1.27<--, 1.09<--, 1.25<--, 1.23<--, 1.25<--  I&O's Summary      .Urine Clean Catch (Midstream)  03-22 @ 21:52   <10,000 CFU/mL Normal Urogenital Ros  --  --      .Blood Blood-Peripheral  03-22 @ 18:21   No growth to date.  --  --      MEDICATIONS:    MEDICATIONS  (STANDING):  ALBUTerol    90 MICROgram(s) HFA Inhaler 2 Puff(s) Inhalation every 6 hours  ALPRAZolam 0.5 milliGRAM(s) Oral at bedtime  artificial  tears Solution 1 Drop(s) Both EYES every 6 hours  azithromycin   Tablet 500 milliGRAM(s) Oral daily  budesonide 160 MICROgram(s)/formoterol 4.5 MICROgram(s) Inhaler 2 Puff(s) Inhalation two times a day  cefTRIAXone   IVPB 1000 milliGRAM(s) IV Intermittent every 24 hours  losartan 50 milliGRAM(s) Oral daily  metoprolol tartrate 12.5 milliGRAM(s) Oral two times a day  polyethylene glycol 3350 17 Gram(s) Oral daily  predniSONE   Tablet 20 milliGRAM(s) Oral daily  senna 2 Tablet(s) Oral at bedtime  warfarin 2 milliGRAM(s) Oral every 24 hours      MEDICATIONS  (PRN):  bisacodyl 5 milliGRAM(s) Oral every 12 hours PRN Constipation  oxyCODONE    IR 2.5 milliGRAM(s) Oral every 6 hours PRN Severe Pain (7 - 10)        REVIEW OF SYSTEMS:                           ALL ROS DONE [ X   ]      CONSTITUTIONAL:  LETHARGIC [   ], FEVER [   ], UNRESPONSIVE [   ]  CVS:  CP  [   ], SOB, [   ], PALPITATIONS [   ], DIZZYNESS [   ]  RS: COUGH [   ], SPUTUM [   ]  GI: ABDOMINAL PAIN [   ], NAUSEA [   ], VOMITINGS [   ], DIARRHEA [   ], CONSTIPATION [   ]  :  DYSURIA [   ], NOCTURIA [   ], INCREASED FREQUENCY [   ], DRIBLING [   ],  SKELETAL: PAINFUL JOINTS [   ], SWOLLEN JOINTS [   ], NECK ACHE [   ], LOW BACK ACHE [   ],  SKIN : ULCERS [   ], RASH [   ], ITCHING [   ]  CNS: HEAD ACHE [   ], DOUBLE VISION [   ], BLURRED VISION [   ], AMS / CONFUSION [   ], SEIZURES [   ], WEAKNESS [   ],TINGLING / NUMBNESS [   ]      PHYSICAL EXAMINATION:    GENERAL APPEARANCE: NO DISTRESS  HEENT:  NO PALLOR, NO  JVD,  NO   NODES, NECK SUPPLE  CVS: S1 +, S2 +,   RS: AEEB,  OCCASIONAL  RALES +,   NO RONCHI  ABD: SOFT, NT, NO, BS +  EXT: NO PE  SKIN: WARM,   SKELETAL:  ROM ACCEPTABLE  CNS:  AAO X 2   , NO  DEFICITS      RADIOLOGY :    < from: CT Abdomen and Pelvis w/ IV Cont (03.25.21 @ 11:03) >    IMPRESSION:  Constipated right colon. Left inguinal hernia containing nonobstructed sigmoid colon.    Trace bilateral pleural effusions.    1 cm left lower lobe nodule. Please refer to recent chest CT.    L4-5 hypertrophic spinal stenosis.    Other chronic findings as above.    < end of copied text >      < from: US Hepatic & Pancreatic (03.24.21 @ 12:58) >    IMPRESSION: Unremarkable study. Status post cholecystectomy. Right renal cyst    < end of copied text >    < from: CT Angio Chest w/ IV Cont (03.22.21 @ 18:03) >    IMPRESSION: Noevidence of pulmonary embolism.  Scattered small nonspecific pulmonary nodules.  These may be followed in 3-6 months in a high risk patient.    < end of copied text >          ASSESSMENT :     Fever due to unspecified condition    DVT (deep venous thrombosis)    Gout    HTN (hypertension)    Skin cancer    Asthma    S/P inguinal hernia repair    S/P TURP        PLAN:  HPI:  88 yo M pt with a PMH of hard of hearing, asthma, DVT, PE x 3, HTN, afib, skin cancer, presents to the ED with complaints of worsening cough and shortness of breath. Pt states he was feeling sob for a few days. Pt is having LE pain but states that is chronic As per Daughter pt has new eye discharge for a few days as well but was unable to get him to see an eye doctor. Pt is unable to contribute any further to hx bc he wants to rest. Pt denies chest pain , abdominal pain, n/v/d or any other complaints    Off note pt 1st COVID-19 vaccine but not his 2nd as yet    GOC: full code (22 Mar 2021 16:31)    -  SEPSIS, PNEUMONIA ON IV. CEFTRIAXONE, AND AZITHROMYCIN  -  ASTHMA EXACERBATION ON PREDNISONE ( TAPERING DOSES ), BRONCHODILATORS  -  FECAL IMPACTION - DC OPIOIDS. ADD MOM 30 ML Q DAILY  -  NON OBSTRUCTING LEFT INGUINAL HERNIA - OBSERVE   -  A.FIB ON COUMADIN - F/UP INR ON MONDAY ( INR 2.7 )  -  GI PROPHYLAXIS  -  DC PLAN WHEN STABLE     - DR. LIDIA ALEGRE - COVERING DR. MUNIZ     
  Chart reviewed.  Patient seen and examined; d/w attending and IDR team    CC:  Patient is a 87y old  Male who presents with a chief complaint of sob, cough (22 Mar 2021 16:31)    87 year old Man with hx of hard of hearing, asthma, DVT, PE x 3, HTN, afib, skin cancer, chronic LE pain who on 3/22 presented to the ED with complaints of worsening cough and shortness of breath.    MEDICATIONS  (STANDING):  ALBUTerol    90 MICROgram(s) HFA Inhaler 2 Puff(s) Inhalation every 6 hours  ALPRAZolam 0.5 milliGRAM(s) Oral at bedtime  artificial  tears Solution 1 Drop(s) Both EYES every 6 hours  azithromycin   Tablet 500 milliGRAM(s) Oral daily  budesonide 160 MICROgram(s)/formoterol 4.5 MICROgram(s) Inhaler 2 Puff(s) Inhalation two times a day  cefTRIAXone   IVPB 1000 milliGRAM(s) IV Intermittent every 24 hours  losartan 50 milliGRAM(s) Oral daily  methylPREDNISolone sodium succinate Injectable 40 milliGRAM(s) IV Push daily  metoprolol tartrate 12.5 milliGRAM(s) Oral two times a day  warfarin 2 milliGRAM(s) Oral once    MEDICATIONS  (PRN):  oxyCODONE    IR 2.5 milliGRAM(s) Oral every 6 hours PRN Severe Pain (7 - 10)    REVIEW OF SYSTEMS	  Respiratory and Thorax:  Cough  Cardiovascular:	No chest pain  Gastrointestinal:	 left lower quadrant pain radiating to his back  Genitourinary:	No dysuria noted  Neurological:  Alert, disoriented to situation.  c/o dizziness post ct scan	    Vital Signs Last 24 Hrs  T(C): 36.8 (25 Mar 2021 11:19), Max: 36.8 (25 Mar 2021 05:20)  T(F): 98.2 (25 Mar 2021 11:19), Max: 98.2 (25 Mar 2021 05:20)  HR: 69 (25 Mar 2021 11:26) (63 - 79)  BP: 153/78 (25 Mar 2021 11:26) (130/72 - 170/96)  BP(mean): --  RR: 18 (25 Mar 2021 11:19) (18 - 18)  SpO2: 100% (25 Mar 2021 11:19) (95% - 100%)    PHYSICAL EXAM:  General: Frail elderly, NAD  HEENT:  pupils equal, round, reactive,  CV:  +S1, +S2, irregular  RESP: scattered coarse wheezing on auscultation.  GI:  abdomen mildly distended,  + BS.       -  CVA tenderness negative   MSK:   normal muscle tone, no atrophy, no rigidity, no contractions  EXT:   no clubbing, no cyanosis, no edema, no calf pain, swelling or erythema  NEURO:  AAOX2-3, no focal neurological deficits, follows all commands, able to move extremities spontaneously.  motor strength weak but equal bilaterally.  No facial droop noted.                            12.6   19.30 )-----------( 263      ( 25 Mar 2021 06:39 )             38.0         136  |  102  |  22<H>  ----------------------------<  115<H>  4.4   |  25  |  1.09    Ca    8.4      25 Mar 2021 06:39  Mg     2.2         PT/INR - ( 25 Mar 2021 06:39 )   PT: 25.2 sec;   INR: 2.20 ratio         PTT - ( 24 Mar 2021 11:46 )  PTT:33.5 sec                           Color: Yellow / Appearance: Clear / S.010 / pH: x  Gluc: x / Ketone: Negative  / Bili: Negative / Urobili: Negative   Blood: x / Protein: 15 / Nitrite: Negative   Leuk Esterase: Negative / RBC: 0-2 /HPF / WBC 0-2 /HPF   Sq Epi: x / Non Sq Epi: Occasional /HPF / Bacteria: Few /HPF    Lactate, Blood: 1.8 mmol/L ( @ 15:11)    Blood, Urine: Trace ( @ 15:11)    Serum Pro-Brain Natriuretic Peptide: 2588 pg/mL (21 @ 11:45)      < from: CT Angio Chest w/ IV Cont (21 @ 18:03) >  IMPRESSION: Noevidence of pulmonary embolism.  Scattered small nonspecific pulmonary nodules.  These may be followed in 3-6 months in a high risk patient.      CAPILLARY BLOOD GLUCOSE      POCT Blood Glucose.: 126 mg/dL (25 Mar 2021 11:07)                                
HPI:  88 YOM admitted with sepsis.  Started on Rocephin and Azithromycin.  Now afebrile and WBCs downtrending.  Patient examined at bedside, resting comfortably, denies pain, SOB or NVD.    OVERNIGHT EVENTS:  Pt complained of constipation overnight, bowel regimen therapeutic.    REVIEW OF SYSTEMS:      CONSTITUTIONAL: No fever  EYES: no acute visual disturbances  NECK: No pain or stiffness  RESPIRATORY: No cough; No shortness of breath  CARDIOVASCULAR: No chest pain, no palpitations  GASTROINTESTINAL: No pain. No nausea, vomiting or diarrhea   NEUROLOGICAL: No headache or numbness, no tremors  MUSCULOSKELETAL: No joint pain, no muscle pain  GENITOURINARY: no dysuria, no frequency, no hesitancy  PSYCHIATRY: no depression, no anxiety  ALL OTHER  ROS negative        Vital Signs Last 24 Hrs  T(C): 36.9 (26 Mar 2021 14:00), Max: 36.9 (26 Mar 2021 14:00)  T(F): 98.5 (26 Mar 2021 14:00), Max: 98.5 (26 Mar 2021 14:00)  HR: 80 (26 Mar 2021 14:00) (65 - 80)  BP: 124/86 (26 Mar 2021 14:00) (105/60 - 136/76)  BP(mean): --  RR: 18 (26 Mar 2021 14:00) (17 - 18)  SpO2: 99% (26 Mar 2021 14:00) (98% - 100%)    ________________________________________________  PHYSICAL EXAM:    GENERAL: NAD  HEENT: Normocephalic; conjunctivae and sclerae clear;  NECK : supple, no JVD  CHEST/LUNG: Clear to auscultation  HEART: S1 S2  regular  ABDOMEN: Soft, Nontender, Nondistended; Bowel sounds present  EXTREMITIES: no cyanosis; no LE edema; no calf tenderness  SKIN: warm and dry  NERVOUS SYSTEM:  Alert; no new deficits    _________________________________________________  CURRENT MEDICATIONS:    MEDICATIONS  (STANDING):  ALBUTerol    90 MICROgram(s) HFA Inhaler 2 Puff(s) Inhalation every 6 hours  ALPRAZolam 0.5 milliGRAM(s) Oral at bedtime  artificial  tears Solution 1 Drop(s) Both EYES every 6 hours  azithromycin   Tablet 500 milliGRAM(s) Oral daily  budesonide 160 MICROgram(s)/formoterol 4.5 MICROgram(s) Inhaler 2 Puff(s) Inhalation two times a day  cefTRIAXone   IVPB 1000 milliGRAM(s) IV Intermittent every 24 hours  losartan 50 milliGRAM(s) Oral daily  metoprolol tartrate 12.5 milliGRAM(s) Oral two times a day  polyethylene glycol 3350 17 Gram(s) Oral daily  predniSONE   Tablet 20 milliGRAM(s) Oral daily  senna 2 Tablet(s) Oral at bedtime    MEDICATIONS  (PRN):  bisacodyl 5 milliGRAM(s) Oral every 12 hours PRN Constipation  oxyCODONE    IR 2.5 milliGRAM(s) Oral every 6 hours PRN Severe Pain (7 - 10)      __________________________________________________  LABS:                          13.1   18.97 )-----------( 274      ( 26 Mar 2021 06:57 )             38.8     03-26    133<L>  |  99  |  26<H>  ----------------------------<  116<H>  4.0   |  24  |  1.27    Ca    8.3<L>      26 Mar 2021 06:57      PT/INR - ( 26 Mar 2021 06:57 )   PT: 25.7 sec;   INR: 2.24 ratio         PTT - ( 26 Mar 2021 06:57 )  PTT:28.9 sec    CAPILLARY BLOOD GLUCOSE          __________________________________________________  RADIOLOGY & ADDITIONAL TESTS:    Imaging Personally Reviewed:  YES    < from: Xray Chest 1 View-PORTABLE IMMEDIATE (03.22.21 @ 14:33) >  FINDINGS/  IMPRESSION:  No consolidation or pleural effusion    Heart size cannot be accurately assessed in this projection.      < end of copied text >    < from: CT Angio Chest w/ IV Cont (03.22.21 @ 18:03) >  IMPRESSION: Noevidence of pulmonary embolism.  Scattered small nonspecific pulmonary nodules.  These may be followed in 3-6 months in a high risk patient.    < end of copied text >    < from: CT Abdomen and Pelvis w/ IV Cont (03.25.21 @ 11:03) >  FINDINGS:  LOWER CHEST: Trace bilateral pleural effusions. 1 cm left lower lobe nodule (2:8).    LIVER: Hepatic steatosis.  BILE DUCTS: Normal caliber.  GALLBLADDER: Cholelithiasis.  SPLEEN: Within normal limits.  PANCREAS: Within normal limits.  ADRENALS: Within normal limits.  KIDNEYS/URETERS: 3.6 cm left midpole cyst. Left lower pole renal cortical scarring. 2.3 cm right upper pole cyst. No hydroureteronephrosis. Moderate bilateral nonspecific perinephric fat stranding.    BLADDER: Within normal limits.  REPRODUCTIVE ORGANS: Unremarkable prostate. Right seminal vesicle calcifications possibly post inflammatory.    BOWEL: No bowel obstruction. Appendix is not visualized. No evidence of inflammation in the pericecal region.. Moderate fecal burden in the right colon. Left inguinal hernia containing nonobstructed sigmoid colon.  PERITONEUM: No ascites.  VESSELS: Atherosclerotic changes.  RETROPERITONEUM/LYMPH NODES: No lymphadenopathy. Mild presacral edema.  ABDOMINAL WALL: Left inguinal hernia containing nonobstructed sigmoid colon.  BONES: Degenerative changes. Moderate hypertrophic L4-5 spinal stenosis.    IMPRESSION:  Constipated right colon. Left inguinal hernia containing nonobstructed sigmoid colon.    Trace bilateral pleural effusions.    1 cm left lower lobe nodule. Please refer to recent chest CT.    L4-5 hypertrophic spinal stenosis.    Other chronic findings as above.    < end of copied text >      Consultant(s) Notes Reviewed:   YES     Plan of care was discussed with patient and /or primary care giver; all questions and concerns were addressed and care was aligned with patient's wishes.    Plan discussed with attending and consulting physicians.  
  Chart reviewed.  Patient seen and examined; d/w attending and IDR team    CC:  Patient is a 87y old  Male who presents with a chief complaint of sob, cough (22 Mar 2021 16:31)    87 year old Man with hx of hard of hearing, asthma, DVT, PE x 3, HTN, afib, skin cancer, chronic LE pain who on 3/22 presented to the ED with complaints of worsening cough and shortness of breath, and "eye discharge"      ROS:  denies feeling short of breath at rest, none today thus far, denies CP/palpitation/HA/dizziness/abd pain/n/v/d/f/c    MEDICATIONS  (STANDING):  ALBUTerol    90 MICROgram(s) HFA Inhaler 2 Puff(s) Inhalation every 6 hours  ALPRAZolam 0.5 milliGRAM(s) Oral at bedtime  azithromycin  IVPB 500 milliGRAM(s) IV Intermittent every 24 hours  budesonide 160 MICROgram(s)/formoterol 4.5 MICROgram(s) Inhaler 2 Puff(s) Inhalation two times a day  cefTRIAXone   IVPB 1000 milliGRAM(s) IV Intermittent every 24 hours  methylPREDNISolone sodium succinate Injectable 40 milliGRAM(s) IV Push daily  metoprolol tartrate 12.5 milliGRAM(s) Oral two times a day    MEDICATIONS  (PRN):    VITALS:  Vital Signs Last 24 Hrs  T(C): 36.4 (23 Mar 2021 05:20), Max: 36.4 (22 Mar 2021 16:21)  T(F): 97.5 (23 Mar 2021 05:20), Max: 97.6 (22 Mar 2021 16:21)  HR: 72 (23 Mar 2021 05:20) (71 - 90)  BP: 142/80 (23 Mar 2021 05:20) (138/84 - 154/75)  BP(mean): --  RR: 18 (23 Mar 2021 05:20) (17 - 18)  SpO2: 100% (23 Mar 2021 05:20) (98% - 100%)      PHYSICAL EXAM:    General: Frail elderly, NAD    HEENT:  pupils equal, round, reactive, no discharge, no oral lesions    NECK:   supple    CV:  +S1, +S2, irregular, no murmurs or rubs    RESP: scattered coarse wheezing on auscultation; even and unlabored breathing with O2 2L NC, no rales    BREAST:  not examined    GI:  abdomen soft, non-tender, non-distended, normal BS, no bruits, no abdominal masses, no palpable masses    RECTAL:  not examined    :  not examined    MSK:   normal muscle tone, no atrophy, no rigidity, no contractions    EXT:   no clubbing, no cyanosis, no edema, no calf pain, swelling or erythema    VASCULAR:  pulses equal and symmetric in the upper and lower extremities    NEURO:  AAOX2-3, no focal neurological deficits, follows all commands, able to move extremities spontaneously    LABS:                     13.7   12.52 )-----------( 222      ( 23 Mar 2021 08:21 )             41.0     03-23    133<L>  |  100  |  13  ----------------------------<  180<H>  4.1   |  25  |  1.23    Ca    8.8      23 Mar 2021 08:21  Phos  2.9     03-23  Mg     2.3         TPro  x   /  Alb  x   /  TBili  0.8  /  DBili  0.3<H>  /  AST  x   /  ALT  x   /  AlkPhos  x   23    CARDIAC MARKERS ( 22 Mar 2021 11:45 )  0.028 ng/mL / x     / 101 U/L / x     / x        LIVER FUNCTIONS - ( 22 Mar 2021 11:45 )  Alb: 3.1 g/dL / Pro: 7.4 g/dL / ALK PHOS: 122 U/L / ALT: 75 U/L DA / AST: 62 U/L / GGT: x           PT/INR - ( 23 Mar 2021 08:21 )   PT: 25.4 sec;   INR: 2.22 ratio       PTT - ( 23 Mar 2021 08:21 )  PTT:35.5 sec  Urinalysis Basic - ( 22 Mar 2021 15:11 )    Color: Yellow / Appearance: Clear / S.010 / pH: x  Gluc: x / Ketone: Negative  / Bili: Negative / Urobili: Negative   Blood: x / Protein: 15 / Nitrite: Negative   Leuk Esterase: Negative / RBC: 0-2 /HPF / WBC 0-2 /HPF   Sq Epi: x / Non Sq Epi: Occasional /HPF / Bacteria: Few /HPF    Lactate, Blood: 1.8 mmol/L ( @ 15:11)    Blood, Urine: Trace ( @ 15:11)    Serum Pro-Brain Natriuretic Peptide: 2588 pg/mL (21 @ 11:45)    PERTINENT DIAGNOSTICS  < from: CT Angio Chest w/ IV Cont (21 @ 18:03) >  IMPRESSION: Noevidence of pulmonary embolism.  Scattered small nonspecific pulmonary nodules.  These may be followed in 3-6 months in a high risk patient.    < end of copied text >                            
  Chart reviewed.  Patient seen and examined; d/w attending and IDR team    CC:  Patient is a 87y old  Male who presents with a chief complaint of sob, cough (22 Mar 2021 16:31)    87 year old Man with hx of hard of hearing, asthma, DVT, PE x 3, HTN, afib, skin cancer, chronic LE pain who on 3/22 presented to the ED with complaints of worsening cough and shortness of breath.    MEDICATIONS  (STANDING):  ALBUTerol    90 MICROgram(s) HFA Inhaler 2 Puff(s) Inhalation every 6 hours  ALPRAZolam 0.5 milliGRAM(s) Oral at bedtime  budesonide 160 MICROgram(s)/formoterol 4.5 MICROgram(s) Inhaler 2 Puff(s) Inhalation two times a day  cefTRIAXone   IVPB 1000 milliGRAM(s) IV Intermittent every 24 hours  methylPREDNISolone sodium succinate Injectable 40 milliGRAM(s) IV Push daily  metoprolol tartrate 12.5 milliGRAM(s) Oral two times a day    MEDICATIONS  (PRN):    REVIEW OF SYSTEMS	  Respiratory and Thorax:  Cough  Cardiovascular:	No chest pain  Gastrointestinal:	 left lower quadrant pain radiating to his back  Genitourinary:	No dysuria noted  Neurological:  Alert, disoriented to situation	    Vital Signs Last 24 Hrs  T(C): 36.8 (24 Mar 2021 05:37), Max: 36.9 (23 Mar 2021 20:51)  T(F): 98.2 (24 Mar 2021 05:37), Max: 98.4 (23 Mar 2021 20:51)  HR: 69 (24 Mar 2021 07:21) (69 - 82)  BP: 129/83 (24 Mar 2021 07:21) (112/73 - 139/71)  BP(mean): --  RR: 18 (24 Mar 2021 05:37) (18 - 20)  SpO2: 100% (24 Mar 2021 05:37) (99% - 100%)      PHYSICAL EXAM:  General: Frail elderly, NAD  HEENT:  pupils equal, round, reactive,  CV:  +S1, +S2, irregular  RESP: scattered coarse wheezing on auscultation.  GI:  abdomen mildly distended,  + BS.       -  CVA tenderness negative   MSK:   normal muscle tone, no atrophy, no rigidity, no contractions  EXT:   no clubbing, no cyanosis, no edema, no calf pain, swelling or erythema  NEURO:  AAOX2-3, no focal neurological deficits, follows all commands, able to move extremities spontaneously                            11.8   22.00 )-----------( 243      ( 24 Mar 2021 07:56 )             35.0       03-24    132<L>  |  98  |  22<H>  ----------------------------<  154<H>  4.0   |  26  |  1.25    Ca    8.4      24 Mar 2021 07:56  Phos  2.9     03-23  Mg     2.2     -24    TPro  x   /  Alb  x   /  TBili  0.8  /  DBili  0.3<H>  /  AST  x   /  ALT  x   /  AlkPhos  x   23      CARDIAC MARKERS ( 22 Mar 2021 11:45 )  0.028 ng/mL / x     / 101 U/L / x     / x        LIVER FUNCTIONS - ( 22 Mar 2021 11:45 )  Alb: 3.1 g/dL / Pro: 7.4 g/dL / ALK PHOS: 122 U/L / ALT: 75 U/L DA / AST: 62 U/L / GGT: x           PT/INR - ( 24 Mar 2021 11:46 )   PT: 25.7 sec;   INR: 2.24 ratio    PTT - ( 24 Mar 2021 11:46 )  PTT:33.5 sec    Color: Yellow / Appearance: Clear / S.010 / pH: x  Gluc: x / Ketone: Negative  / Bili: Negative / Urobili: Negative   Blood: x / Protein: 15 / Nitrite: Negative   Leuk Esterase: Negative / RBC: 0-2 /HPF / WBC 0-2 /HPF   Sq Epi: x / Non Sq Epi: Occasional /HPF / Bacteria: Few /HPF    Lactate, Blood: 1.8 mmol/L ( @ 15:11)    Blood, Urine: Trace ( @ 15:11)    Serum Pro-Brain Natriuretic Peptide: 2588 pg/mL (21 @ 11:45)      < from: CT Angio Chest w/ IV Cont (21 @ 18:03) >  IMPRESSION: Noevidence of pulmonary embolism.  Scattered small nonspecific pulmonary nodules.  These may be followed in 3-6 months in a high risk patient.

## 2021-03-28 ENCOUNTER — TRANSCRIPTION ENCOUNTER (OUTPATIENT)
Age: 86
End: 2021-03-28

## 2021-03-28 VITALS
TEMPERATURE: 98 F | HEART RATE: 76 BPM | DIASTOLIC BLOOD PRESSURE: 81 MMHG | RESPIRATION RATE: 16 BRPM | OXYGEN SATURATION: 99 % | SYSTOLIC BLOOD PRESSURE: 145 MMHG

## 2021-03-28 LAB
ANION GAP SERPL CALC-SCNC: 7 MMOL/L — SIGNIFICANT CHANGE UP (ref 5–17)
APTT BLD: 30.5 SEC — SIGNIFICANT CHANGE UP (ref 27.5–35.5)
BUN SERPL-MCNC: 24 MG/DL — HIGH (ref 7–18)
CALCIUM SERPL-MCNC: 8.3 MG/DL — LOW (ref 8.4–10.5)
CHLORIDE SERPL-SCNC: 103 MMOL/L — SIGNIFICANT CHANGE UP (ref 96–108)
CO2 SERPL-SCNC: 28 MMOL/L — SIGNIFICANT CHANGE UP (ref 22–31)
CREAT SERPL-MCNC: 1.19 MG/DL — SIGNIFICANT CHANGE UP (ref 0.5–1.3)
GLUCOSE SERPL-MCNC: 100 MG/DL — HIGH (ref 70–99)
HCT VFR BLD CALC: 37.9 % — LOW (ref 39–50)
HGB BLD-MCNC: 12.7 G/DL — LOW (ref 13–17)
INR BLD: 2.76 RATIO — HIGH (ref 0.88–1.16)
MAGNESIUM SERPL-MCNC: 2.4 MG/DL — SIGNIFICANT CHANGE UP (ref 1.6–2.6)
MCHC RBC-ENTMCNC: 28.5 PG — SIGNIFICANT CHANGE UP (ref 27–34)
MCHC RBC-ENTMCNC: 33.5 GM/DL — SIGNIFICANT CHANGE UP (ref 32–36)
MCV RBC AUTO: 85 FL — SIGNIFICANT CHANGE UP (ref 80–100)
NRBC # BLD: 0 /100 WBCS — SIGNIFICANT CHANGE UP (ref 0–0)
PHOSPHATE SERPL-MCNC: 2.8 MG/DL — SIGNIFICANT CHANGE UP (ref 2.5–4.5)
PLATELET # BLD AUTO: 240 K/UL — SIGNIFICANT CHANGE UP (ref 150–400)
POTASSIUM SERPL-MCNC: 4.2 MMOL/L — SIGNIFICANT CHANGE UP (ref 3.5–5.3)
POTASSIUM SERPL-SCNC: 4.2 MMOL/L — SIGNIFICANT CHANGE UP (ref 3.5–5.3)
PROTHROM AB SERPL-ACNC: 31.3 SEC — HIGH (ref 10.6–13.6)
RBC # BLD: 4.46 M/UL — SIGNIFICANT CHANGE UP (ref 4.2–5.8)
RBC # FLD: 14.6 % — HIGH (ref 10.3–14.5)
SODIUM SERPL-SCNC: 138 MMOL/L — SIGNIFICANT CHANGE UP (ref 135–145)
WBC # BLD: 15.66 K/UL — HIGH (ref 3.8–10.5)
WBC # FLD AUTO: 15.66 K/UL — HIGH (ref 3.8–10.5)

## 2021-03-28 PROCEDURE — 84443 ASSAY THYROID STIM HORMONE: CPT

## 2021-03-28 PROCEDURE — 86140 C-REACTIVE PROTEIN: CPT

## 2021-03-28 PROCEDURE — 84145 PROCALCITONIN (PCT): CPT

## 2021-03-28 PROCEDURE — 92610 EVALUATE SWALLOWING FUNCTION: CPT

## 2021-03-28 PROCEDURE — 87086 URINE CULTURE/COLONY COUNT: CPT

## 2021-03-28 PROCEDURE — 80074 ACUTE HEPATITIS PANEL: CPT

## 2021-03-28 PROCEDURE — 84484 ASSAY OF TROPONIN QUANT: CPT

## 2021-03-28 PROCEDURE — 97161 PT EVAL LOW COMPLEX 20 MIN: CPT

## 2021-03-28 PROCEDURE — 86769 SARS-COV-2 COVID-19 ANTIBODY: CPT

## 2021-03-28 PROCEDURE — 82550 ASSAY OF CK (CPK): CPT

## 2021-03-28 PROCEDURE — 82728 ASSAY OF FERRITIN: CPT

## 2021-03-28 PROCEDURE — 82962 GLUCOSE BLOOD TEST: CPT

## 2021-03-28 PROCEDURE — 83880 ASSAY OF NATRIURETIC PEPTIDE: CPT

## 2021-03-28 PROCEDURE — 87040 BLOOD CULTURE FOR BACTERIA: CPT

## 2021-03-28 PROCEDURE — 80053 COMPREHEN METABOLIC PANEL: CPT

## 2021-03-28 PROCEDURE — 83735 ASSAY OF MAGNESIUM: CPT

## 2021-03-28 PROCEDURE — 85025 COMPLETE CBC W/AUTO DIFF WBC: CPT

## 2021-03-28 PROCEDURE — 83605 ASSAY OF LACTIC ACID: CPT

## 2021-03-28 PROCEDURE — 83036 HEMOGLOBIN GLYCOSYLATED A1C: CPT

## 2021-03-28 PROCEDURE — 84100 ASSAY OF PHOSPHORUS: CPT

## 2021-03-28 PROCEDURE — 80061 LIPID PANEL: CPT

## 2021-03-28 PROCEDURE — 36415 COLL VENOUS BLD VENIPUNCTURE: CPT

## 2021-03-28 PROCEDURE — 82247 BILIRUBIN TOTAL: CPT

## 2021-03-28 PROCEDURE — 85610 PROTHROMBIN TIME: CPT

## 2021-03-28 PROCEDURE — 71045 X-RAY EXAM CHEST 1 VIEW: CPT

## 2021-03-28 PROCEDURE — 76705 ECHO EXAM OF ABDOMEN: CPT

## 2021-03-28 PROCEDURE — 87635 SARS-COV-2 COVID-19 AMP PRB: CPT

## 2021-03-28 PROCEDURE — 94640 AIRWAY INHALATION TREATMENT: CPT

## 2021-03-28 PROCEDURE — 80048 BASIC METABOLIC PNL TOTAL CA: CPT

## 2021-03-28 PROCEDURE — 74177 CT ABD & PELVIS W/CONTRAST: CPT

## 2021-03-28 PROCEDURE — 85730 THROMBOPLASTIN TIME PARTIAL: CPT

## 2021-03-28 PROCEDURE — 99285 EMERGENCY DEPT VISIT HI MDM: CPT | Mod: 25

## 2021-03-28 PROCEDURE — 0225U NFCT DS DNA&RNA 21 SARSCOV2: CPT

## 2021-03-28 PROCEDURE — 82248 BILIRUBIN DIRECT: CPT

## 2021-03-28 PROCEDURE — 93306 TTE W/DOPPLER COMPLETE: CPT

## 2021-03-28 PROCEDURE — 81001 URINALYSIS AUTO W/SCOPE: CPT

## 2021-03-28 PROCEDURE — 82607 VITAMIN B-12: CPT

## 2021-03-28 PROCEDURE — 71275 CT ANGIOGRAPHY CHEST: CPT

## 2021-03-28 PROCEDURE — 85027 COMPLETE CBC AUTOMATED: CPT

## 2021-03-28 PROCEDURE — 93005 ELECTROCARDIOGRAM TRACING: CPT

## 2021-03-28 PROCEDURE — 82746 ASSAY OF FOLIC ACID SERUM: CPT

## 2021-03-28 PROCEDURE — 83615 LACTATE (LD) (LDH) ENZYME: CPT

## 2021-03-28 PROCEDURE — 85652 RBC SED RATE AUTOMATED: CPT

## 2021-03-28 RX ORDER — LOSARTAN POTASSIUM 100 MG/1
1 TABLET, FILM COATED ORAL
Qty: 0 | Refills: 0 | DISCHARGE

## 2021-03-28 RX ORDER — BUDESONIDE AND FORMOTEROL FUMARATE DIHYDRATE 160; 4.5 UG/1; UG/1
1 AEROSOL RESPIRATORY (INHALATION)
Qty: 1 | Refills: 0
Start: 2021-03-28

## 2021-03-28 RX ORDER — ALBUTEROL 90 UG/1
1 AEROSOL, METERED ORAL
Qty: 1 | Refills: 0
Start: 2021-03-28 | End: 2021-04-06

## 2021-03-28 RX ORDER — CEFUROXIME AXETIL 250 MG
1 TABLET ORAL
Qty: 2 | Refills: 0
Start: 2021-03-28 | End: 2021-03-28

## 2021-03-28 RX ORDER — ALBUTEROL 90 UG/1
2 AEROSOL, METERED ORAL
Qty: 1 | Refills: 0
Start: 2021-03-28

## 2021-03-28 RX ORDER — ALBUTEROL 90 UG/1
2.5 AEROSOL, METERED ORAL
Qty: 60 | Refills: 0
Start: 2021-03-28 | End: 2021-04-26

## 2021-03-28 RX ADMIN — ALBUTEROL 2 PUFF(S): 90 AEROSOL, METERED ORAL at 03:14

## 2021-03-28 RX ADMIN — Medication 1 DROP(S): at 01:00

## 2021-03-28 RX ADMIN — BUDESONIDE AND FORMOTEROL FUMARATE DIHYDRATE 2 PUFF(S): 160; 4.5 AEROSOL RESPIRATORY (INHALATION) at 12:39

## 2021-03-28 RX ADMIN — Medication 12.5 MILLIGRAM(S): at 05:05

## 2021-03-28 RX ADMIN — AZITHROMYCIN 500 MILLIGRAM(S): 500 TABLET, FILM COATED ORAL at 13:15

## 2021-03-28 RX ADMIN — ALBUTEROL 2 PUFF(S): 90 AEROSOL, METERED ORAL at 12:39

## 2021-03-28 RX ADMIN — Medication 1 DROP(S): at 12:39

## 2021-03-28 RX ADMIN — CEFTRIAXONE 100 MILLIGRAM(S): 500 INJECTION, POWDER, FOR SOLUTION INTRAMUSCULAR; INTRAVENOUS at 12:45

## 2021-03-28 RX ADMIN — POLYETHYLENE GLYCOL 3350 17 GRAM(S): 17 POWDER, FOR SOLUTION ORAL at 12:39

## 2021-03-28 RX ADMIN — Medication 20 MILLIGRAM(S): at 05:05

## 2021-03-28 RX ADMIN — Medication 1 DROP(S): at 05:05

## 2021-03-28 RX ADMIN — LOSARTAN POTASSIUM 50 MILLIGRAM(S): 100 TABLET, FILM COATED ORAL at 05:05

## 2021-03-28 NOTE — DISCHARGE NOTE NURSING/CASE MANAGEMENT/SOCIAL WORK - PATIENT PORTAL LINK FT
You can access the FollowMyHealth Patient Portal offered by [facility] by registering at the following website: [email or web address]. By joining [employer]’s FollowMyHealth portal, you will also be able to view your health information using other applications (apps) compatible with our system.

## 2021-03-30 ENCOUNTER — APPOINTMENT (OUTPATIENT)
Dept: CARE COORDINATION | Facility: HOME HEALTH | Age: 86
End: 2021-03-30
Payer: MEDICARE

## 2021-03-30 DIAGNOSIS — Z86.718 PERSONAL HISTORY OF OTHER VENOUS THROMBOSIS AND EMBOLISM: ICD-10-CM

## 2021-03-30 DIAGNOSIS — J18.9 PNEUMONIA, UNSPECIFIED ORGANISM: ICD-10-CM

## 2021-03-30 DIAGNOSIS — Z86.79 PERSONAL HISTORY OF OTHER DISEASES OF THE CIRCULATORY SYSTEM: ICD-10-CM

## 2021-03-30 DIAGNOSIS — J44.9 CHRONIC OBSTRUCTIVE PULMONARY DISEASE, UNSPECIFIED: ICD-10-CM

## 2021-03-30 DIAGNOSIS — Z87.891 PERSONAL HISTORY OF NICOTINE DEPENDENCE: ICD-10-CM

## 2021-03-30 PROBLEM — Z00.00 ENCOUNTER FOR PREVENTIVE HEALTH EXAMINATION: Status: ACTIVE | Noted: 2021-03-30

## 2021-03-30 PROCEDURE — 99348 HOME/RES VST EST LOW MDM 30: CPT | Mod: 95

## 2021-03-30 RX ORDER — ALBUTEROL SULFATE 2.5 MG/3ML
(2.5 MG/3ML) SOLUTION RESPIRATORY (INHALATION)
Refills: 0 | Status: ACTIVE | COMMUNITY
Start: 2021-03-30

## 2021-03-30 RX ORDER — WARFARIN 2 MG/1
2 TABLET ORAL DAILY
Refills: 0 | Status: ACTIVE | COMMUNITY
Start: 2021-03-30

## 2021-03-30 RX ORDER — ALPRAZOLAM 0.5 MG/1
0.5 TABLET ORAL
Refills: 0 | Status: ACTIVE | COMMUNITY
Start: 2021-03-30

## 2021-03-30 RX ORDER — BUDESONIDE AND FORMOTEROL FUMARATE DIHYDRATE 160; 4.5 UG/1; UG/1
160-4.5 AEROSOL RESPIRATORY (INHALATION)
Refills: 0 | Status: ACTIVE | COMMUNITY
Start: 2021-03-30

## 2021-03-30 RX ORDER — METOPROLOL SUCCINATE 25 MG/1
25 TABLET, EXTENDED RELEASE ORAL
Refills: 0 | Status: ACTIVE | COMMUNITY
Start: 2021-03-30

## 2021-03-30 RX ORDER — ALBUTEROL SULFATE 90 UG/1
108 (90 BASE) INHALANT RESPIRATORY (INHALATION)
Qty: 1 | Refills: 0 | Status: ACTIVE | COMMUNITY
Start: 2021-03-30

## 2021-04-02 NOTE — PHYSICAL EXAM
[No Respiratory Distress] : no respiratory distress  [No Edema] : there was no peripheral edema [de-identified] : Physical exam limited ^ telehealth encounter no acute distress, frail, elderly male A&O x 3

## 2021-04-02 NOTE — HISTORY OF PRESENT ILLNESS
[Home] : at home, [unfilled] , at the time of the visit. [Spouse] : spouse [Family Member] : family member [Verbal consent obtained from patient] : the patient, [unfilled] [Other Location: e.g. Home (Enter Location, City,State)___] : at [unfilled] [FreeTextEntry3] : Paige Kunz (daughter) [FreeTextEntry1] : "Follow up PNA/ COPD" Telehealth visit completed with pt, spouse and daughter present. Pt is 88 year old Czech speaking male A&O x 3- opting daughter to translate. Per pt and daughter, pt completed antibiotic since d/c home 3/28. Last dose of prednisone today. Pt has been feeling better overall but remains SOB and tired with exertion. Denies cough, fever, chest pain, LE edema. Discharged on symbicort  160/4.5 mg inhaler 1 puff QD and albuterol inhaler q6h. Denies palpitations , N.V, headache. Utilizing inhaler with assist from spouse.  [de-identified] : Hospital Course: \par This is an 87 yr old man with hard of hearing, asthma, DVT, Pulmonary Embolus x 3, HTN, afib, skin cancer, admitted 3/22/21 with complaints of worsening cough and shortness of breath x several days. He also stated that he cough with drinking liquid but not eating solids. CT angiogram was negative for PE. He had a pulmonary consult, and a swallow evaluation was recommended to r/o swallowing difficulties that may lead to aspiration. Swallow evaluation was done on 3/26/21 and his swallow function was evaluated as intact. Basic chin tuck education was provided. It was recommended that he allow for swallow between intakes; alternate food with liquid; maintain upright posture during/after eating for 30 mins; oral hygiene; position upright (90 degrees); Multiple swallows per bolus + Liquid wash after solid. \par He was also treated for pneumonia with Azithromycin, Rocephin and steroids. He is feeling better and his WBC count is trending down. He was medically cleared for discharge with recommendation to take Ceftin x 1 day and complete steroid course (2 days left). These prescriptions were sent electronically to his pharmacy. He will also be given a written script for a nebulizer.

## 2021-05-12 NOTE — ED ADULT NURSE NOTE - CAS EDN INTEG ASSESS
- - - Birth Control Pills Pregnancy And Lactation Text: This medication should be avoided if pregnant and for the first 30 days post-partum.

## 2022-04-01 NOTE — DIETITIAN INITIAL EVALUATION ADULT. - FACTORS AFF FOOD INTAKE
See [employer] message sent to patient.    weakness, fever, cough, DVT, HTN, skin cancer, asthma, s/p TURP & inguinal hernia repair/difficulty with food procurement/preparation/persistent constipation/other (specify)

## 2023-05-05 NOTE — DIETITIAN INITIAL EVALUATION ADULT. - HEIGHT FOR BMI (INCHES)
6 Otezla Counseling: The side effects of Otezla were discussed with the patient, including but not limited to worsening or new depression, weight loss, diarrhea, nausea, upper respiratory tract infection, and headache. Patient instructed to call the office should any adverse effect occur.  The patient verbalized understanding of the proper use and possible adverse effects of Otezla.  All the patient's questions and concerns were addressed.

## 2023-05-09 NOTE — DIETITIAN INITIAL EVALUATION ADULT. - ORAL NUTRITION SUPPLEMENTS
Add Ensure Enlive 1 can BID (700 Kcal, Protein 40 grams) Low Dose Naltrexone Counseling- I discussed with the patient the potential risks and side effects of low dose naltrexone including but not limited to: more vivid dreams, headaches, nausea, vomiting, abdominal pain, fatigue, dizziness, and anxiety.

## 2023-09-02 NOTE — PATIENT PROFILE ADULT - NSPRESCRALCSIXMORE_GEN_A_NUR
Sister Isabel as in earlier this week, given steroids and now fine.  Ramo now has picked up same symptoms, high fever and cough.  Severe autism, hard to bring to office, are you willing to call in steroid rx to [facility] or do you need to see him?  Thx!   unable to assess

## 2024-09-17 ENCOUNTER — EMERGENCY (EMERGENCY)
Facility: HOSPITAL | Age: 89
LOS: 1 days | Discharge: ANOTHER HOSPITAL | End: 2024-09-17
Attending: EMERGENCY MEDICINE
Payer: MEDICARE

## 2024-09-17 ENCOUNTER — INPATIENT (INPATIENT)
Facility: HOSPITAL | Age: 89
LOS: 1 days | Discharge: HOME | End: 2024-09-19
Attending: INTERNAL MEDICINE | Admitting: INTERNAL MEDICINE
Payer: MEDICARE

## 2024-09-17 VITALS
RESPIRATION RATE: 20 BRPM | HEART RATE: 34 BPM | TEMPERATURE: 97 F | OXYGEN SATURATION: 99 % | SYSTOLIC BLOOD PRESSURE: 139 MMHG | WEIGHT: 130.07 LBS | DIASTOLIC BLOOD PRESSURE: 57 MMHG | HEIGHT: 70 IN

## 2024-09-17 VITALS
OXYGEN SATURATION: 98 % | TEMPERATURE: 98 F | DIASTOLIC BLOOD PRESSURE: 79 MMHG | RESPIRATION RATE: 17 BRPM | HEART RATE: 35 BPM | SYSTOLIC BLOOD PRESSURE: 133 MMHG

## 2024-09-17 VITALS
WEIGHT: 136.91 LBS | OXYGEN SATURATION: 97 % | TEMPERATURE: 98 F | HEIGHT: 70 IN | DIASTOLIC BLOOD PRESSURE: 67 MMHG | RESPIRATION RATE: 16 BRPM | SYSTOLIC BLOOD PRESSURE: 145 MMHG | HEART RATE: 32 BPM

## 2024-09-17 DIAGNOSIS — Z90.79 ACQUIRED ABSENCE OF OTHER GENITAL ORGAN(S): Chronic | ICD-10-CM

## 2024-09-17 DIAGNOSIS — Z98.890 OTHER SPECIFIED POSTPROCEDURAL STATES: Chronic | ICD-10-CM

## 2024-09-17 LAB
ALBUMIN SERPL ELPH-MCNC: 3.2 G/DL — LOW (ref 3.5–5)
ALP SERPL-CCNC: 122 U/L — HIGH (ref 40–120)
ALT FLD-CCNC: 27 U/L DA — SIGNIFICANT CHANGE UP (ref 10–60)
ANION GAP SERPL CALC-SCNC: 7 MMOL/L — SIGNIFICANT CHANGE UP (ref 5–17)
APTT BLD: 56 SEC — HIGH (ref 24.5–35.6)
AST SERPL-CCNC: 33 U/L — SIGNIFICANT CHANGE UP (ref 10–40)
BASOPHILS # BLD AUTO: 0.03 K/UL — SIGNIFICANT CHANGE UP (ref 0–0.2)
BASOPHILS NFR BLD AUTO: 0.3 % — SIGNIFICANT CHANGE UP (ref 0–2)
BILIRUB SERPL-MCNC: 1.8 MG/DL — HIGH (ref 0.2–1.2)
BUN SERPL-MCNC: 24 MG/DL — HIGH (ref 7–18)
CALCIUM SERPL-MCNC: 8.9 MG/DL — SIGNIFICANT CHANGE UP (ref 8.4–10.5)
CHLORIDE SERPL-SCNC: 95 MMOL/L — LOW (ref 96–108)
CO2 SERPL-SCNC: 25 MMOL/L — SIGNIFICANT CHANGE UP (ref 22–31)
CREAT SERPL-MCNC: 1.3 MG/DL — SIGNIFICANT CHANGE UP (ref 0.5–1.3)
EGFR: 52 ML/MIN/1.73M2 — LOW
EOSINOPHIL # BLD AUTO: 0.15 K/UL — SIGNIFICANT CHANGE UP (ref 0–0.5)
EOSINOPHIL NFR BLD AUTO: 1.7 % — SIGNIFICANT CHANGE UP (ref 0–6)
GLUCOSE SERPL-MCNC: 108 MG/DL — HIGH (ref 70–99)
HCT VFR BLD CALC: 38.8 % — LOW (ref 39–50)
HGB BLD-MCNC: 13.6 G/DL — SIGNIFICANT CHANGE UP (ref 13–17)
IMM GRANULOCYTES NFR BLD AUTO: 0.7 % — SIGNIFICANT CHANGE UP (ref 0–0.9)
INR BLD: 3.43 RATIO — HIGH (ref 0.85–1.18)
LYMPHOCYTES # BLD AUTO: 1.57 K/UL — SIGNIFICANT CHANGE UP (ref 1–3.3)
LYMPHOCYTES # BLD AUTO: 17.9 % — SIGNIFICANT CHANGE UP (ref 13–44)
MAGNESIUM SERPL-MCNC: 2 MG/DL — SIGNIFICANT CHANGE UP (ref 1.6–2.6)
MCHC RBC-ENTMCNC: 27.6 PG — SIGNIFICANT CHANGE UP (ref 27–34)
MCHC RBC-ENTMCNC: 35.1 GM/DL — SIGNIFICANT CHANGE UP (ref 32–36)
MCV RBC AUTO: 78.9 FL — LOW (ref 80–100)
MONOCYTES # BLD AUTO: 0.95 K/UL — HIGH (ref 0–0.9)
MONOCYTES NFR BLD AUTO: 10.8 % — SIGNIFICANT CHANGE UP (ref 2–14)
NEUTROPHILS # BLD AUTO: 6.03 K/UL — SIGNIFICANT CHANGE UP (ref 1.8–7.4)
NEUTROPHILS NFR BLD AUTO: 68.6 % — SIGNIFICANT CHANGE UP (ref 43–77)
NRBC # BLD: 0 /100 WBCS — SIGNIFICANT CHANGE UP (ref 0–0)
PLATELET # BLD AUTO: 180 K/UL — SIGNIFICANT CHANGE UP (ref 150–400)
POTASSIUM SERPL-MCNC: 4.1 MMOL/L — SIGNIFICANT CHANGE UP (ref 3.5–5.3)
POTASSIUM SERPL-SCNC: 4.1 MMOL/L — SIGNIFICANT CHANGE UP (ref 3.5–5.3)
PROT SERPL-MCNC: 7.1 G/DL — SIGNIFICANT CHANGE UP (ref 6–8.3)
PROTHROM AB SERPL-ACNC: 37.7 SEC — HIGH (ref 9.5–13)
RBC # BLD: 4.92 M/UL — SIGNIFICANT CHANGE UP (ref 4.2–5.8)
RBC # FLD: 15.8 % — HIGH (ref 10.3–14.5)
SODIUM SERPL-SCNC: 127 MMOL/L — LOW (ref 135–145)
TROPONIN I, HIGH SENSITIVITY RESULT: 56.9 NG/L — SIGNIFICANT CHANGE UP
TROPONIN I, HIGH SENSITIVITY RESULT: 60.4 NG/L — SIGNIFICANT CHANGE UP
WBC # BLD: 8.79 K/UL — SIGNIFICANT CHANGE UP (ref 3.8–10.5)
WBC # FLD AUTO: 8.79 K/UL — SIGNIFICANT CHANGE UP (ref 3.8–10.5)

## 2024-09-17 PROCEDURE — 99285 EMERGENCY DEPT VISIT HI MDM: CPT | Mod: FS

## 2024-09-17 PROCEDURE — 84484 ASSAY OF TROPONIN QUANT: CPT

## 2024-09-17 PROCEDURE — 83735 ASSAY OF MAGNESIUM: CPT

## 2024-09-17 PROCEDURE — 71045 X-RAY EXAM CHEST 1 VIEW: CPT

## 2024-09-17 PROCEDURE — 84443 ASSAY THYROID STIM HORMONE: CPT

## 2024-09-17 PROCEDURE — 71045 X-RAY EXAM CHEST 1 VIEW: CPT | Mod: 26

## 2024-09-17 PROCEDURE — 93005 ELECTROCARDIOGRAM TRACING: CPT

## 2024-09-17 PROCEDURE — 85025 COMPLETE CBC W/AUTO DIFF WBC: CPT

## 2024-09-17 PROCEDURE — 99285 EMERGENCY DEPT VISIT HI MDM: CPT | Mod: 25

## 2024-09-17 PROCEDURE — 36415 COLL VENOUS BLD VENIPUNCTURE: CPT

## 2024-09-17 PROCEDURE — 99285 EMERGENCY DEPT VISIT HI MDM: CPT

## 2024-09-17 PROCEDURE — 80053 COMPREHEN METABOLIC PANEL: CPT

## 2024-09-17 PROCEDURE — 85730 THROMBOPLASTIN TIME PARTIAL: CPT

## 2024-09-17 PROCEDURE — 85610 PROTHROMBIN TIME: CPT

## 2024-09-17 RX ORDER — ALPRAZOLAM 0.25 MG
0.5 TABLET ORAL ONCE
Refills: 0 | Status: DISCONTINUED | OUTPATIENT
Start: 2024-09-17 | End: 2024-09-17

## 2024-09-17 RX ADMIN — Medication 0.5 MILLIGRAM(S): at 20:15

## 2024-09-17 NOTE — ED PROVIDER NOTE - PHYSICAL EXAMINATION
General: Patient well appearing,  bradycardic  HEENT: MMM, trachea midline  Cardiovascular:  bradycardic  Respiratory: No respiratory distress, CTAB   MSK: 3+ pitting edema bilateral lower extremities  Neuro: Moves all extremities  Skin: No rashes or lesions

## 2024-09-17 NOTE — ED ADULT NURSE NOTE - NSFALLHARMRISKINTERV_ED_ALL_ED

## 2024-09-17 NOTE — ED ADULT NURSE NOTE - IN THE PAST 12 MONTHS HAVE YOU USED DRUGS OTHER THAN THOSE REQUIRED FOR MEDICAL REASON?
Xi Carranza is a 80 y.o. female who presents today for the following:  Chief Complaint   Patient presents with    GERD     food won't go down good    Colon Polyps    Abdominal Pain         No Known Allergies    Current Outpatient Medications   Medication Sig    aspirin 81 mg chewable tablet Take 81 mg by mouth daily.  amoxicillin (AMOXIL) 500 mg capsule Take 500 mg by mouth daily. For mrsa    Breo Ellipta 100-25 mcg/dose inhaler     hydrOXYzine pamoate (VISTARIL) 25 mg capsule     nitroglycerin (NITROSTAT) 0.4 mg SL tablet 1 Tablet by SubLINGual route.  Entresto 24-26 mg tablet     carvediloL (COREG) 3.125 mg tablet     temazepam (RESTORIL) 15 mg capsule     ferrous sulfate (FeroSuL) 325 mg (65 mg iron) tablet Take  by mouth Daily (before breakfast).  polyethylene glycol (MIRALAX) 17 gram/dose powder Use as directed  Indications: emptying of the bowel    mirtazapine (REMERON) 7.5 mg tablet Take 1 Tab by mouth nightly.  furosemide (LASIX) 20 mg tablet Take 20 mg by mouth daily.  memantine (NAMENDA) 5 mg tablet Take 10 mg by mouth two (2) times a day.  acetaminophen (TYLENOL) 500 mg tablet Take 500 mg by mouth every six (6) hours as needed for Pain.  albuterol (PROVENTIL HFA, VENTOLIN HFA, PROAIR HFA) 90 mcg/actuation inhaler Take 2 Puffs by inhalation every four (4) hours as needed for Wheezing.  donepeziL (ARICEPT) 23 mg film coated tablet Take 23 mg by mouth nightly.  omeprazole (PRILOSEC) 20 mg capsule Take 20 mg by mouth daily as needed (Acid Reflux).  topiramate (TOPAMAX) 100 mg tablet Take 100 mg by mouth two (2) times a day.  olopatadine (PATANOL) 0.1 % ophthalmic solution Administer 2 Drops to both eyes two (2) times a day.  theophylline ER,12 hour, (THEOCHRON) 200 mg tablet Take 1 Tab by mouth two (2) times a day.  polyethylene glycol (MIRALAX) 17 gram packet Take 1 Packet by mouth daily.     mometasone-formoterol (Dulera) 100-5 mcg/actuation HFA inhaler Take 2 Puffs by inhalation two (2) times a day. No current facility-administered medications for this visit.        Past Medical History:   Diagnosis Date    Alzheimer's dementia ([facility] [address] [facility] [address] 6/23/2021    Dementia ([facility] [address]     Dislocation of hip prosthesis ([facility] [address] JUDGE, [facility] [address] [place] [address] [employer] No

## 2024-09-17 NOTE — ED PROVIDER NOTE - CLINICAL SUMMARY MEDICAL DECISION MAKING FREE TEXT BOX
89-year-old male who is having hospice evaluation today when it was noted that his heart rate was in the 30s to 40s.  EKG showed sinus bradycardia, repeat EKG showed idioventricular.   Laboratory results include troponin x 2 negative, sodium 127, INR 3.43.  Attempted to discuss with our cardiology but no cardiology  available.  Patient with stable blood pressure with SBP's in the 130s.  At his baseline mental status, no chest pain or shortness of breath.  Asymptomatic bradycardia.  Discussed with EP cardiology at [facility] and will transfer after discussion with daughters for evaluation, observation and potential pacemaker placement.

## 2024-09-17 NOTE — ED ADULT NURSE NOTE - OBJECTIVE STATEMENT
pt aaox3, Chinese speaking transferred from  due to bradycardia. pt family at bedside. pt is DNI but wants resuscitation. PT ARRIVED WITH RT FA 22G.

## 2024-09-17 NOTE — ED ADULT NURSE NOTE - ED STAT RN HANDOFF DETAILS 2
Pt care endorsed to [facility] no acute distress noted pt sinus bradycardia asymptomatic

## 2024-09-17 NOTE — ED ADULT TRIAGE NOTE - CHIEF COMPLAINT QUOTE
Pt is transfer from . Pt coming in for symptomatic bradycardia HR 30s. BP normal Pt was being consulted for hospice by home care nurse and was found to be bradycardic. History of arrest, HTN, HLD, DVT, gout.      PT placed on zoll pads Pt is transfer from . Pt coming in for symptomatic bradycardia HR 30s. BP normal Pt was being consulted for hospice by home care nurse and was found to be bradycardic. History of arrest, HTN, HLD, DVT, gout.      PT placed on zoll pads and charge made aware.

## 2024-09-17 NOTE — CONSULT NOTE ADULT - ASSESSMENT
89-year-old Yoruba speaking male.  Daughter at bedside prefered for translation. Pt with past medical history of DVTs, atrial fibrillation on coumadin, HTN, Gout txf from [facility] for EP eval for slow Afib/flutter.  Had a hospice evaluation today where he was found to have heart rate in the 30s to 40s.  Per family over the past month has been more fatigued and unable to participate in ADL's.  Previously was able to ambulate.  Unclear when bradycardia started.  Is currently being worked up for home hospice.  Pt is DNI but request CPR.  Family to further discuss GOC, but would like the minimal invasive procedure possible.      #Atrial flutter with slow ventricular response  - Hx of Afib is on coumadin at home.  Metoprolol stopped >1 month ago  - EKG was reviewed, patient is in atrial flutter with slow ventricular response, HR currently 38,  SBP, HDS  - Would likely benefit from either DCCV or PPM device.  - INR 3.43 hold PM dose of coumadin and recheck tomorrow  - continuous tele monitoring  - Keep K>4 & Mg >2  - HOLD all AV kenna blockers  - Does not require CCU at this time.      Thank you, if any questions or clinical situation changes please call Otto [facility]ave #[phone].  Attending Attestation to follow     89-year-old Indonesian speaking male.  Daughter at bedside prefered for translation. Pt with past medical history of DVTs, atrial fibrillation on coumadin, HTN, Gout txf from [facility] for EP eval for slow Afib/flutter.  Had a hospice evaluation today where he was found to have heart rate in the 30s to 40s.  Per family over the past month has been more fatigued and unable to participate in ADL's.  Previously was able to ambulate.  Unclear when bradycardia started.  Is currently being worked up for home hospice.  Pt is DNI but request CPR.  Family to further discuss GOC, but would like the minimal invasive procedure possible.      #Atrial flutter with slow ventricular response  - Hx of Afib is on coumadin at home.  Metoprolol stopped >1 month ago  - EKG was reviewed, patient is in atrial flutter with slow ventricular response, HR currently 38,  SBP, HDS  - Would likely benefit from either DCCV or PPM device.  - INR 3.43 hold PM dose of coumadin and recheck tomorrow  - continuous tele monitoring  - Keep K>4 & Mg >2  - HOLD all AV kenna blockers  - Keep NPO  - Does not require CCU at this time.      Thank you, if any questions or clinical situation changes please call K[facility] #[phone].  Attending Attestation to follow

## 2024-09-17 NOTE — ED ADULT NURSE NOTE - NSFALLHARMRISKINTERV_ED_ALL_ED

## 2024-09-17 NOTE — CONSULT NOTE ADULT - SUBJECTIVE AND OBJECTIVE BOX
HPI: 89-year-old Romansh speaking male.  Daughter at bedside prefered for translation. Pt with past medical history of DVTs, atrial fibrillation on coumadin, HTN, Gout txf from [facility] for EP eval for slow Afib/flutter.  Had a hospice evaluation today where he was found to have heart rate in the 30s to 40s.  Per family over the past month has been more fatigued and unable to participate in ADL's.  Previosly was able to ambulate.  Patient denies any pain, cough, fever.    	     Allergies    No Known Allergies    Intolerances    	  MEDICATIONS:  ALPRAZolam (XANAX) 0.5 mg tablet  warfarin (COUMADIN) 2 mg tablet  umeclidinium-vilanteroL (ANORO ELLIPTA) 62.5-25 mcg/actuation blister with  device  gabapentin (NEURONTIN) 100 mg capsule  furosemide (LASIX) 20 mg tablet  losartan (COZAAR) 100 mg tablet  dextran 70-hypromellose (ARTIFICIAL TEARS,[id number],) drops                PAST MEDICAL & SURGICAL HISTORY:  Patient Active Problem List  Diagnosis   Chronic anticoagulation   Primary hypertension   ASHD (arteriosclerotic heart disease)   Anxiety   Memory loss   Venous insufficiency LE   Chronically on benzodiazepine therapy   Oropharyngeal dysphagia   Vitamin B12 deficiency   Chronic atrial fibrillation   Urethral stricture          FAMILY HISTORY:      SUBSTANCE USE  Tobacco Usage:  He reports that he quit smoking about 32 years ago. His smoking  use included cigarettes. He has a 40 pack-year smoking history. He has never  used smokeless tobacco.   Alcohol Usage: denies  Recreational drugs: denies      REVIEW OF SYSTEMS:  CV: chest pain (-), palpitation (-), orthopnea (-), PND (-), edema (+)  PULM: SOB (-), cough (-), wheezing (-), hemoptysis (-).   CONST: fever (-), chills (-) or fatigue (-)  GI: abdominal distension (-), abdominal pain (-) , nausea/vomiting (-), hematemesis, (-), melena (-), hematochezia (-)  : dysuria (-), frequency (-), hematuria (-).   NEURO: numbness (-), weakness (-), dizziness (-)  SKIN: itching (-), rash (-)  HEENT:  visual changes (-); vertigo or throat pain (-);  neck stiffness (-)   All other review of systems is negative unless indicated above.      T(C): 36.6 (09-17-24 @ 21:14), Max: 36.6 (09-17-24 @ 15:20)  HR: 32 (09-17-24 @ 21:14) (32 - 37)  BP: 145/67 (09-17-24 @ 21:14) (126/58 - 145/67)  RR: 16 (09-17-24 @ 21:14) (16 - 20)  SpO2: 97% (09-17-24 @ 21:14) (97% - 99%)  Wt(kg): --  I&O's Summary      Physical Exam:  General: Patient well appearing,  bradycardic  HEENT: MMM, trachea midline  Cardiovascular:  bradycardic  Respiratory: No respiratory distress, CTAB   MSK: 3+ pitting edema bilateral lower extremities  Neuro: Moves all extremities  Skin: No rashes or lesions    CBC Full  -  ( 17 Sep 2024 14:49 )  WBC Count : 8.79 K/uL  Hemoglobin : 13.6 g/dL  Hematocrit : 38.8 %  Platelet Count - Automated : 180 K/uL  Mean Cell Volume : 78.9 fl  Mean Cell Hemoglobin : 27.6 pg  Mean Cell Hemoglobin Concentration : 35.1 gm/dL  Auto Neutrophil # : 6.03 K/uL  Auto Lymphocyte # : 1.57 K/uL  Auto Monocyte # : 0.95 K/uL  Auto Eosinophil # : 0.15 K/uL  Auto Basophil # : 0.03 K/uL  Auto Neutrophil % : 68.6 %  Auto Lymphocyte % : 17.9 %  Auto Monocyte % : 10.8 %  Auto Eosinophil % : 1.7 %  Auto Basophil % : 0.3 %    09-17    127[L]  |  95[L]  |  24[H]  ----------------------------<  108[H]  4.1   |  25  |  1.30    Ca    8.9      17 Sep 2024 14:49  Mg     2.0     09-17    TPro  7.1  /  Alb  3.2[L]  /  TBili  1.8[H]  /  DBili  x   /  AST  33  /  ALT  27  /  AlkPhos  122[H]  09-17    proBNP:   Lipid Profile:   HgA1c:   TSH: Thyroid Stimulating Hormone, Serum: 2.88 uU/mL (09-17 @ 14:49)    Diagnostic testing:  cath:  --    echo:  < from: TTE with Doppler (w/Cont) (03.23.21 @ 07:22) >  CONCLUSIONS:  TECHNICALLY VERY DIFFICULT STUDY, POOR ACOUSTIC WINDOWS    1. Trace mitral regurgitation.  2. Endocardium not well visualized; grossly normal left  ventricular systolic function.  Endocardial visualization  was enhancedwith Definity echo contrast.  3. Right ventricle not well visualized.    < end of copied text >

## 2024-09-17 NOTE — ED PROVIDER NOTE - OBJECTIVE STATEMENT
89-year-old male with past medical history of DVTs, atrial fibrillation on Coumadin who presents after having hospice evaluation where he was found to have heart rate in the 30s to 40s.  Patient denies any pain, cough, fever. 89-year-old male with past medical history of DVTs, atrial fibrillation on coumadin who presents after having hospice evaluation where he was found to have heart rate in the 30s to 40s.  Patient denies any pain, cough, fever.

## 2024-09-17 NOTE — ED ADULT NURSE NOTE - CHIEF COMPLAINT QUOTE
Pt is transfer from . Pt coming in for symptomatic bradycardia HR 30s. BP normal Pt was being consulted for hospice by home care nurse and was found to be bradycardic. History of arrest, HTN, HLD, DVT, gout.      PT placed on zoll pads and charge made aware.

## 2024-09-18 DIAGNOSIS — R79.1 ABNORMAL COAGULATION PROFILE: ICD-10-CM

## 2024-09-18 DIAGNOSIS — Z71.89 OTHER SPECIFIED COUNSELING: ICD-10-CM

## 2024-09-18 DIAGNOSIS — Z29.9 ENCOUNTER FOR PROPHYLACTIC MEASURES, UNSPECIFIED: ICD-10-CM

## 2024-09-18 DIAGNOSIS — R00.1 BRADYCARDIA, UNSPECIFIED: ICD-10-CM

## 2024-09-18 DIAGNOSIS — I10 ESSENTIAL (PRIMARY) HYPERTENSION: ICD-10-CM

## 2024-09-18 DIAGNOSIS — I48.92 UNSPECIFIED ATRIAL FLUTTER: ICD-10-CM

## 2024-09-18 LAB
ALBUMIN SERPL ELPH-MCNC: 3.2 G/DL — LOW (ref 3.3–5)
ALP SERPL-CCNC: 100 U/L — SIGNIFICANT CHANGE UP (ref 40–120)
ALT FLD-CCNC: 16 U/L — SIGNIFICANT CHANGE UP (ref 4–41)
ANION GAP SERPL CALC-SCNC: 11 MMOL/L — SIGNIFICANT CHANGE UP (ref 7–14)
APTT BLD: 34.6 SEC — SIGNIFICANT CHANGE UP (ref 24.5–35.6)
AST SERPL-CCNC: 30 U/L — SIGNIFICANT CHANGE UP (ref 4–40)
BILIRUB SERPL-MCNC: 1.6 MG/DL — HIGH (ref 0.2–1.2)
BUN SERPL-MCNC: 21 MG/DL — SIGNIFICANT CHANGE UP (ref 7–23)
CALCIUM SERPL-MCNC: 8.5 MG/DL — SIGNIFICANT CHANGE UP (ref 8.4–10.5)
CHLORIDE SERPL-SCNC: 94 MMOL/L — LOW (ref 98–107)
CO2 SERPL-SCNC: 21 MMOL/L — LOW (ref 22–31)
CREAT SERPL-MCNC: 1.17 MG/DL — SIGNIFICANT CHANGE UP (ref 0.5–1.3)
EGFR: 59 ML/MIN/1.73M2 — LOW
GLUCOSE SERPL-MCNC: 93 MG/DL — SIGNIFICANT CHANGE UP (ref 70–99)
HCT VFR BLD CALC: 33.7 % — LOW (ref 39–50)
HGB BLD-MCNC: 12 G/DL — LOW (ref 13–17)
INR BLD: 3.2 RATIO — HIGH (ref 0.85–1.18)
MAGNESIUM SERPL-MCNC: 1.8 MG/DL — SIGNIFICANT CHANGE UP (ref 1.6–2.6)
MCHC RBC-ENTMCNC: 27.5 PG — SIGNIFICANT CHANGE UP (ref 27–34)
MCHC RBC-ENTMCNC: 35.6 GM/DL — SIGNIFICANT CHANGE UP (ref 32–36)
MCV RBC AUTO: 77.1 FL — LOW (ref 80–100)
NRBC # BLD: 0 /100 WBCS — SIGNIFICANT CHANGE UP (ref 0–0)
NRBC # FLD: 0 K/UL — SIGNIFICANT CHANGE UP (ref 0–0)
PHOSPHATE SERPL-MCNC: 3 MG/DL — SIGNIFICANT CHANGE UP (ref 2.5–4.5)
PLATELET # BLD AUTO: 161 K/UL — SIGNIFICANT CHANGE UP (ref 150–400)
POTASSIUM SERPL-MCNC: 4 MMOL/L — SIGNIFICANT CHANGE UP (ref 3.5–5.3)
POTASSIUM SERPL-SCNC: 4 MMOL/L — SIGNIFICANT CHANGE UP (ref 3.5–5.3)
PROT SERPL-MCNC: 6.1 G/DL — SIGNIFICANT CHANGE UP (ref 6–8.3)
PROTHROM AB SERPL-ACNC: 35 SEC — HIGH (ref 9.5–13)
RBC # BLD: 4.37 M/UL — SIGNIFICANT CHANGE UP (ref 4.2–5.8)
RBC # FLD: 15.7 % — HIGH (ref 10.3–14.5)
SODIUM SERPL-SCNC: 126 MMOL/L — LOW (ref 135–145)
WBC # BLD: 7.25 K/UL — SIGNIFICANT CHANGE UP (ref 3.8–10.5)
WBC # FLD AUTO: 7.25 K/UL — SIGNIFICANT CHANGE UP (ref 3.8–10.5)

## 2024-09-18 PROCEDURE — 99233 SBSQ HOSP IP/OBS HIGH 50: CPT

## 2024-09-18 PROCEDURE — 99223 1ST HOSP IP/OBS HIGH 75: CPT

## 2024-09-18 RX ORDER — LOSARTAN POTASSIUM 50 MG/1
100 TABLET ORAL
Refills: 0 | DISCHARGE

## 2024-09-18 RX ORDER — WARFARIN SODIUM 2 MG
1.5 TABLET ORAL ONCE
Refills: 0 | Status: COMPLETED | OUTPATIENT
Start: 2024-09-18 | End: 2024-09-18

## 2024-09-18 RX ORDER — ALPRAZOLAM 0.25 MG
0.5 TABLET ORAL EVERY 24 HOURS
Refills: 0 | Status: DISCONTINUED | OUTPATIENT
Start: 2024-09-18 | End: 2024-09-19

## 2024-09-18 RX ORDER — ALPRAZOLAM 0.25 MG
0.5 TABLET ORAL AT BEDTIME
Refills: 0 | Status: DISCONTINUED | OUTPATIENT
Start: 2024-09-18 | End: 2024-09-18

## 2024-09-18 RX ORDER — FUROSEMIDE 40 MG
20 TABLET ORAL
Refills: 0 | DISCHARGE

## 2024-09-18 RX ADMIN — Medication 1.5 MILLIGRAM(S): at 21:20

## 2024-09-18 RX ADMIN — Medication 0.5 MILLIGRAM(S): at 21:20

## 2024-09-18 RX ADMIN — Medication 25 GRAM(S): at 11:38

## 2024-09-18 NOTE — H&P ADULT - NSHPPHYSICALEXAM_GEN_ALL_CORE
VITALS:   T(C): 36.2 (09-18-24 @ 01:50), Max: 36.6 (09-17-24 @ 15:20)  HR: 34 (09-18-24 @ 01:50) (32 - 37)  BP: 134/57 (09-18-24 @ 01:50) (126/58 - 145/67)  RR: 16 (09-18-24 @ 01:50) (16 - 20)  SpO2: 100% (09-18-24 @ 01:50) (97% - 100%)    GENERAL: NAD, lying in bed comfortably  HEAD:  Atraumatic, normocephalic  EYES: EOMI, PERRLA, conjunctiva and sclera clear  ENT: Moist mucous membranes  NECK: Supple, no JVD  HEART:slow HR and irregular rhythm, no murmurs, rubs, or gallops  LUNGS: Unlabored respirations.  Clear to auscultation bilaterally, no crackles, wheezing, or rhonchi  ABDOMEN: Soft, nontender, nondistended, +BS  EXTREMITIES: 2+ peripheral pulses bilaterally. No clubbing, cyanosis, or edema  NERVOUS SYSTEM:  A&Ox1-2, no focal deficits   SKIN: No rashes or lesions

## 2024-09-18 NOTE — H&P ADULT - NSHPLABSRESULTS_GEN_ALL_CORE
LABS:                          13.6   8.79  )-----------( 180      ( 17 Sep 2024 14:49 )             38.8     09-17    127[L]  |  95[L]  |  24[H]  ----------------------------<  108[H]  4.1   |  25  |  1.30    Ca    8.9      17 Sep 2024 14:49  Mg     2.0     09-17    TPro  7.1  /  Alb  3.2[L]  /  TBili  1.8[H]  /  DBili  x   /  AST  33  /  ALT  27  /  AlkPhos  122[H]  09-17    PT/INR - ( 17 Sep 2024 15:36 )   PT: 37.7 sec;   INR: 3.43 ratio         PTT - ( 17 Sep 2024 15:36 )  PTT:56.0 sec

## 2024-09-18 NOTE — H&P ADULT - NSHPREVIEWOFSYSTEMS_GEN_ALL_CORE
REVIEW OF SYSTEMS:    CONSTITUTIONAL: No weakness, fevers  EYES/ENT: No visual changes;  No vertigo or throat pain   NECK: No pain or stiffness  RESPIRATORY: No cough, wheezing, hemoptysis; No shortness of breath  CARDIOVASCULAR: No chest pain or palpitations  GASTROINTESTINAL: No abdominal or epigastric pain. No nausea, vomiting, or hematemesis; No diarrhea or constipation. No melena or hematochezia.  GENITOURINARY: No dysuria, frequency or hematuria  NEUROLOGICAL: No numbness or weakness  SKIN: No itching, rashes

## 2024-09-18 NOTE — PROGRESS NOTE ADULT - SUBJECTIVE AND OBJECTIVE BOX
Cardiology Progress Note  ------------------------------------------------------------------------------------------  SUBJECTIVE:   - Tele:  - No events overnight. Denies CP, SOB or Palpitations.     -------------------------------------------------------------------------------------------  VS:  T(F): 97.7 (09-18), Max: 97.9 (09-17)  HR: 40 (09-18) (32 - 74)  BP: 125/68 (09-18) (114/77 - 145/67)  RR: 16 (09-18)  SpO2: 97% (09-18)  I&O's Summary    PHYSICAL EXAM:  GENERAL: NAD  HEAD: Atraumatic, Normocephalic.  ENT: Moist mucous membranes.  NECK: Supple, No JVD.  CHEST/LUNG: Clear to auscultation bilaterally; No rales, rhonchi, wheezing, or rubs. Unlabored respirations.  HEART: Regular rate and rhythm; No murmurs, rubs, or gallops.  ABDOMEN: Bowel sounds present; Soft, Nontender, Nondistended.   EXTREMITIES: No edema. 2+ Peripheral Pulses, brisk capillary refill. No clubbing or cyanosis.     -------------------------------------------------------------------------------------------  LABS:                          12.0   7.25  )-----------( 161      ( 18 Sep 2024 05:32 )             33.7     09-18    126[L]  |  94[L]  |  21  ----------------------------<  93  4.0   |  21[L]  |  1.17    Ca    8.5      18 Sep 2024 05:32  Phos  3.0     09-18  Mg     1.80     09-18    TPro  6.1  /  Alb  3.2[L]  /  TBili  1.6[H]  /  DBili  x   /  AST  30  /  ALT  16  /  AlkPhos  100  09-18    PT/INR - ( 18 Sep 2024 05:32 )   PT: 35.0 sec;   INR: 3.20 ratio         PTT - ( 18 Sep 2024 05:32 )  PTT:34.6 sec            -------------------------------------------------------------------------------------------  Meds:  ALPRAZolam 0.5 milliGRAM(s) Oral at bedtime    -------------------------------------------------------------------------------------------   Patient has minimal verbal communication and translation was done via family for patient and family preferences.     Cardiology Progress Note  ------------------------------------------------------------------------------------------  SUBJECTIVE:   - Tele: Atrial flutter with intermittent atrial fibrillation rates from 25-60   - No events overnight. Denies CP, SOB or Palpitations.     -------------------------------------------------------------------------------------------  VS:  T(F): 97.7 (09-18), Max: 97.9 (09-17)  HR: 40 (09-18) (32 - 74)  BP: 125/68 (09-18) (114/77 - 145/67)  RR: 16 (09-18)  SpO2: 97% (09-18)  I&O's Summary    PHYSICAL EXAM:  GENERAL: NAD, Cachectic. Frail appearing.   HEAD: Atraumatic, Normocephalic.  ENT: Moist mucous membranes.  NECK: Supple, No JVD.  CHEST/LUNG: Clear to auscultation bilaterally; No rales, rhonchi, wheezing, or rubs. Unlabored respirations.  HEART: Regular rate and rhythm; No murmurs, rubs, or gallops.  ABDOMEN: Bowel sounds present; Soft, Nontender, Nondistended.   EXTREMITIES: No edema. 2+ Peripheral Pulses, brisk capillary refill. No clubbing or cyanosis.     -------------------------------------------------------------------------------------------  LABS:                          12.0   7.25  )-----------( 161      ( 18 Sep 2024 05:32 )             33.7     09-18    126[L]  |  94[L]  |  21  ----------------------------<  93  4.0   |  21[L]  |  1.17    Ca    8.5      18 Sep 2024 05:32  Phos  3.0     09-18  Mg     1.80     09-18    TPro  6.1  /  Alb  3.2[L]  /  TBili  1.6[H]  /  DBili  x   /  AST  30  /  ALT  16  /  AlkPhos  100  09-18    PT/INR - ( 18 Sep 2024 05:32 )   PT: 35.0 sec;   INR: 3.20 ratio         PTT - ( 18 Sep 2024 05:32 )  PTT:34.6 sec            -------------------------------------------------------------------------------------------  Meds:  ALPRAZolam 0.5 milliGRAM(s) Oral at bedtime    -------------------------------------------------------------------------------------------

## 2024-09-18 NOTE — H&P ADULT - ASSESSMENT
89M with PMH of DVT and atrial fibrillation on coumadin, HTN, gout transferred from [facility] for EP evaluation after patient was found to be bradycardic to 30's.

## 2024-09-18 NOTE — H&P ADULT - PROBLEM SELECTOR PLAN 1
-pt reporting 1 month worsening lethargy and decreased activity  -found on evaluation today to have HR in 30's  -EKG showing Atrial flutter with slow ventricular response, BP stable  -EP consulted, will discuss with family regarding device placement  -last echo 2021, poor quality study, will order repeat TTE  -monitor on tele  -NPO

## 2024-09-18 NOTE — H&P ADULT - PROBLEM SELECTOR PLAN 3
-per discussion with ED physicians and cardiology, family expressed they wanted patient to be DNI but requesting CPR. ED and cardiology had in-depth conversation regarding how if CPR was to be done that patient would be required to be intubated if ROSC obtained - family indicated understanding and explained that they were leaning on making patient DNR/DNI pending further family discussion.  -palliative care consult in AM, order placed -pt on losartan 100mg and furosemide 20mg at home  -BP stable, however given low HR will hold medications overnight.

## 2024-09-18 NOTE — H&P ADULT - PROBLEM SELECTOR PLAN 4
Diet: DASH  DVT: on warfarin, holding i/s/o elevated INR  Dispo: Pending Diet: DASH  DVT: on warfarin, holding i/s/o elevated INR  Dispo: Pending, will place social work consult at family requesting more resources to help take care of patient at home. -per discussion with ED physicians and cardiology, family expressed they wanted patient to be DNI but requesting CPR. ED and cardiology had in-depth conversation regarding how if CPR was to be done that patient would be required to be intubated if ROSC obtained - family indicated understanding of this scenario. Confirmed with daughter at bedside that as of now this is still what family prefers at this moment however they will discuss about making patient full DNR/DNI  -palliative care consult in AM, order placed

## 2024-09-18 NOTE — ED PROVIDER NOTE - ATTENDING APP SHARED VISIT CONTRIBUTION OF CARE
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 The patient was signed out to the oncoming attending pending the following: repeat A goals of care discussion, labs and cardiology consultation. definite admission. The patient's condition was not amenable to outpatient treatment due to either the lack of feasibility of outpatient care coordination, possibility for further decompensation with adverse outcome if discharge, or treatments and diagnostic  modalities only available during an inpatient hospitalization.

## 2024-09-18 NOTE — H&P ADULT - PROBLEM SELECTOR PLAN 5
Diet: NPO  DVT: on warfarin, holding i/s/o elevated INR  Dispo: Pending, will place social work consult at family requesting more resources to help take care of patient at home.

## 2024-09-18 NOTE — ED PROVIDER NOTE - PROGRESS NOTE DETAILS
BRYANT Mcclelland: Following EP eval no acute interventions recommended advised coumadin to be held and will discuss again w/family the goals of care/options for pacing tomorrow. D/w hospitalist who accepts.

## 2024-09-18 NOTE — ED PROVIDER NOTE - NSICDXPASTMEDICALHX_GEN_ALL_CORE_FT
PAST MEDICAL HISTORY:  Afib on coumadin    Asthma     DVT (deep venous thrombosis)     Gout     HTN (hypertension)     Skin cancer

## 2024-09-18 NOTE — CHART NOTE - NSCHARTNOTEFT_GEN_A_CORE
Pt seen at bedside. Dtrs Juana and Paige present. EP fellow present as well. Pt appeared comfortable, NAD. HR fluctuates between 30s-50s. BP stable. No sig exam findings.    Labs/Meds reviewed.    -F/u EP re: next steps - med management v. PPM  -Avoid AV kenna blockers.  -Continue coumadin - will dose 1.5mg tonight - maintain INR 2-3.  -Per fam - pt on longstanding alprazolam 0.5 q830PM (will reschedule).  -If BP stable - can resume rest of meds.  -Resume diet in the interim as discussed w/ EP.  -Per family - pt had been in [place] for the past 5 years due to the need to be close to family after COVID. Family unclear what their plan is after pt is DC from hospital. Given recent debility - will also c/s PT.  -Will f/u CM/SW for safe DC planning.     All other questions/concerns addressed. Pt seen at bedside. Dtrs Juana and Paige present. EP fellow present as well. Pt appeared comfortable, NAD. HR fluctuates between 30s-50s. BP stable. No sig exam findings.    Labs/Meds reviewed.    -F/u EP re: next steps - med management v. PPM  -Avoid AV kenna blockers.  -Continue coumadin - will dose 1.5mg tonight - maintain INR 2-3.  -Per fam - pt on longstanding alprazolam 0.5 q830PM (will reschedule).  -If BP stable - can resume rest of meds.  -Resume diet in the interim as discussed w/ EP.  -Per family - pt had been in [place] for the past 5 years due to the need to be close to family after COVID. Family unclear what their plan is after pt is DC from hospital. Given recent debility - will also c/s PT.  -Will f/u CM/SW for safe DC planning.     All other questions/concerns addressed.    Rest of plan as outlined in H/P. Pt seen at bedside. Dtrs Juana and Paige present. EP fellow present as well. Pt appeared comfortable, NAD. HR fluctuates between 30s-50s. BP stable. No sig exam findings.    Labs/Meds reviewed.    -F/u EP re: next steps - med management v. PPM  -Avoid AV kenna blockers.  -Continue coumadin - will dose 1.5mg tonight - maintain INR 2-3.  -Per fam - pt on longstanding alprazolam 0.5 q830PM (will reschedule).  -If BP stable - can resume rest of meds.  -Resume diet in the interim as discussed w/ EP.  -Regarding hypoNa -> Pt is on lasix at home. Will hold lasix. IF no improvement -> further studies needed. Monitor BMP.  -Per family - pt had been in [place] for the past 5 years due to the need to be close to family after COVID. Family unclear what their plan is after pt is DC from hospital. Given recent debility - will also c/s PT.  -Will f/u CM/SW for safe DC planning.     All other questions/concerns addressed.    Rest of plan as outlined in H/P.

## 2024-09-18 NOTE — PROGRESS NOTE ADULT - ASSESSMENT
89-year-old German speaking M w/ pmh of DVTs, atrial fibrillation on coumadin, HTN, and Gout txf from [facility] for EP eval for slow Afib/flutter. Patient has notable deterioration in functional status with inability to ADLs and is undergoing hospice referral. Electrophysiology consulted for slow Afib/Aflutter.     EK/17: Slow flutter w/ rate of 38   TTE: LVEF 55%, Trace MR, Grossly normal LV function.   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 QRS complex is wide. The patient's rates     Recommendations   #Atrial fibrillation/Atrial flutter with slow ventricular response   - Rhythm/Rate Control: Avoid any rate and rhythm control at this time given slow rates.   - AC: Warfarin on hold for INR of 3.2, would suggest not holding further doses as 48 hour lag on Warfarin to fully take affect. Decrease dose from last administered dose.  - CTM on telemetry   - Replete K >4 and Mg >2     Please notify Electrophysiology team with any concerns or acute issues.    Donovan Izquierdo MD  Cardiology Fellow  89-year-old Macedonian speaking M w/ pmh of DVTs, atrial fibrillation on coumadin, HTN, and Gout txf from [facility] for EP eval for slow Afib/flutter. Patient has notable deterioration in functional status with inability to ADLs and is undergoing hospice referral. Electrophysiology consulted for slow Afib/Aflutter.     EK/17: Slow flutter w/ rate of 38   TTE: LVEF 55%, Trace MR, Grossly normal LV function.   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 QRS complex is wide. The patient's rates . Guidelines recommend consideration of device placement for slow aflutter/Afib but device insertion is not recommended in patients with a life expectancy >1 year which is a consideration in this case.     Recommendations   #Atrial fibrillation/Atrial flutter with slow ventricular response   - Rhythm/Rate Control: Avoid any rate and rhythm control at this time given slow rates.   - AC: Warfarin on hold for INR of 3.2, would suggest not holding further doses as 48 hour lag on Warfarin to fully take affect. Decrease dose from last administered dose.  - CTM on telemetry   - Replete K >4 and Mg >2     Please notify Electrophysiology team with any concerns or acute issues.    Donovan Izquierdo MD  Cardiology Fellow  89-year-old Irish speaking M w/ pmh of DVTs, atrial fibrillation on coumadin, HTN, and Gout txf from [facility] for EP eval for slow Afib/flutter. Patient has notable deterioration in functional status with inability to ADLs and is undergoing hospice referral. Electrophysiology consulted for slow Afib/Aflutter.     EK/17: Slow flutter w/ rate of 38   TTE: LVEF 55%, Trace MR, Grossly normal LV function.   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 QRS complex is wide. The patient's rates. Guidelines recommend consideration of device placement for slow aflutter/Afib but device insertion is not recommended in patients with a life expectancy <1 year which is a consideration in this case.     Recommendations   #Atrial fibrillation/Atrial flutter with slow ventricular response   - Rhythm/Rate Control: Avoid any rate and rhythm control at this time given slow rates.   - AC: Warfarin on hold for INR of 3.2, would suggest not holding further doses as 48 hour lag on Warfarin to fully take affect. Decrease dose from last administered dose.  - CTM on telemetry   - Replete K >4 and Mg >2     Please notify Electrophysiology team with any concerns or acute issues.    Donovan Izquierdo MD  Cardiology Fellow  89-year-old Syriac speaking M w/ pmh of DVTs, atrial fibrillation on coumadin, HTN, and Gout txf from [facility] for EP eval for slow Afib/flutter. Patient has notable deterioration in functional status with inability to ADLs and is undergoing hospice referral. Electrophysiology consulted for slow Afib/Aflutter.     EK/17: Slow flutter w/ rate of 38   TTE: LVEF 55%, Trace MR, Grossly normal LV function.   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 QRS complex is wide. The patient's rates. Guidelines recommend consideration of device placement for slow aflutter/Afib but device insertion is not recommended in patients with a life expectancy <1 year which is a consideration in this case. Although treatment options could include DCCV vs. Pacemaker placement.     Recommendations   #Atrial fibrillation/Atrial flutter with slow ventricular response   - Rhythm/Rate Control: Avoid any rate and rhythm control at this time given slow rates.   - AC: Warfarin on hold for INR of 3.2, would suggest not holding further doses as 48 hour lag on Warfarin to fully take affect. Decrease dose from last administered dose.  - CTM on telemetry   - Replete K >4 and Mg >2     Please notify Electrophysiology team with any concerns or acute issues.    Donovan Izquierdo MD  Cardiology Fellow  89-year-old Uzbek speaking M w/ pmh of DVTs, atrial fibrillation on coumadin, HTN, and Gout txf from [facility] for EP eval for slow Afib/flutter. Patient has notable deterioration in functional status with inability to ADLs and is undergoing hospice referral. Electrophysiology consulted for slow Afib/Aflutter.     EK/17: Slow flutter w/ rate of 38   TTE: LVEF 55%, Trace MR, Grossly normal LV function.   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 QRS complex is wide. The patient's rates are low and this is likely in the setting of long standing atrial fibrillation and conduction burnout. Per family, was dx >20 years ago. Guidelines recommend consideration of device placement for slow aflutter/Afib in this setting, but device insertion is not recommended in patients with a life expectancy <1 year which is a consideration in this case. Although treatment options could include Palliative vs., DCCV vs. Pacemaker placement. DCCV may result in asystole depending on severity of underlying conduction disease. Pacemaker could be considered but patient has deteriorating functional status, poor po intake with 1 month of decline w/o clear cause which is consistent with life expectancy <1 year and given this procedural risk and post procedural infectious risk would be elevated. Palliative approach for now seems the most reasonable. All options were discussed with the family during the time of assessment with the patients wife and 2 daughters.     Recommendations   #Atrial fibrillation/Atrial flutter with slow ventricular response   - Rhythm/Rate Control: Avoid any rate and rhythm control at this time given slow rates.   - AC: Warfarin on hold for INR of 3.2, would suggest not holding further doses as 48 hour lag on Warfarin to fully take affect. Decrease dose from last administered dose.  - CTM on telemetry   - Replete K >4 and Mg >2     Please notify Electrophysiology team with any concerns or acute issues.    Donovan Izquierdo MD  Cardiology Fellow

## 2024-09-18 NOTE — H&P ADULT - HISTORY OF PRESENT ILLNESS
89M with PMH of DVT and atrial fibrillation on coumadin, HTN, gout transferred from [facility] for EP evaluation after patient was found to be bradycardic to 30's. Daughter at bedside able to give history. Per daughter, patient is usually taken care of by his two daughters, however they report they have had more difficulty in taking care of the patient and submitted a request for hospice evaluation for patient to obtain more resources for care. During the hospice evaluation today he was found to have heart rate in the 30s to 40s and was recommended to come to the ED.  Per family over the past month the patient has been more fatigued and unable to participate in ADL's and was previously able to ambulate. Patient was transferred from [facility] to [facility] ED for cardiac evaluation. EP consulted and saw patient - EKG noted fo aflutter w/ slow ventricular response - will have discussion with family about potential device placement.

## 2024-09-18 NOTE — ED PROVIDER NOTE - PHYSICAL EXAMINATION
CONSTITUTIONAL: Elderly-appearing;  in no apparent distress. Non-toxic appearing.   NEURO: Alert & oriented. Gait steady without assistance. Sensory and motor functions are grossly intact.  PSYCH: Mood appropriate. Thought processes intact.   NECK: Supple  CARD: Regular rate and rhythm, no murmurs  RESP: No accessory muscle use; breath sounds clear and equal bilaterally; no wheezes, rhonchi, or rales     ABD: Soft; non-distended; non-tender. No guarding or rebound.   MUSCULOSKELETAL/EXTREMITIES: FROM in all four extremities; no extremity edema.  BACK: No midline bony tenderness. No sacral ulcers noted.   SKIN: Warm; dry; no apparent lesions or exudate

## 2024-09-18 NOTE — ED PROVIDER NOTE - OBJECTIVE STATEMENT
89-year-old male presenting with daughters at bedside with history of DVTs, A-fib on Coumadin, hypertension, gout presenting for evaluation of bradycardia from [facility].  Patient had a team in his home today to evaluate for hospice and during that evaluation patient found to be bradycardic and sent to the ED for further evaluation.  Patient transferred from [place] given presence of cardiology team here.  Daughter does note over the last month patient has been rapidly declining as far as weakness and decreased appetite.  No other voiced complaints

## 2024-09-19 ENCOUNTER — TRANSCRIPTION ENCOUNTER (OUTPATIENT)
Age: 89
End: 2024-09-19

## 2024-09-19 VITALS
TEMPERATURE: 98 F | DIASTOLIC BLOOD PRESSURE: 68 MMHG | OXYGEN SATURATION: 98 % | RESPIRATION RATE: 18 BRPM | HEART RATE: 38 BPM | SYSTOLIC BLOOD PRESSURE: 168 MMHG

## 2024-09-19 LAB
ALBUMIN SERPL ELPH-MCNC: 3.5 G/DL — SIGNIFICANT CHANGE UP (ref 3.3–5)
ALP SERPL-CCNC: 105 U/L — SIGNIFICANT CHANGE UP (ref 40–120)
ALT FLD-CCNC: 20 U/L — SIGNIFICANT CHANGE UP (ref 4–41)
ANION GAP SERPL CALC-SCNC: 14 MMOL/L — SIGNIFICANT CHANGE UP (ref 7–14)
AST SERPL-CCNC: 31 U/L — SIGNIFICANT CHANGE UP (ref 4–40)
BASOPHILS # BLD AUTO: 0.06 K/UL — SIGNIFICANT CHANGE UP (ref 0–0.2)
BASOPHILS NFR BLD AUTO: 0.8 % — SIGNIFICANT CHANGE UP (ref 0–2)
BILIRUB SERPL-MCNC: 2 MG/DL — HIGH (ref 0.2–1.2)
BUN SERPL-MCNC: 16 MG/DL — SIGNIFICANT CHANGE UP (ref 7–23)
CALCIUM SERPL-MCNC: 8.7 MG/DL — SIGNIFICANT CHANGE UP (ref 8.4–10.5)
CHLORIDE SERPL-SCNC: 92 MMOL/L — LOW (ref 98–107)
CO2 SERPL-SCNC: 20 MMOL/L — LOW (ref 22–31)
CREAT SERPL-MCNC: 1.05 MG/DL — SIGNIFICANT CHANGE UP (ref 0.5–1.3)
EGFR: 67 ML/MIN/1.73M2 — SIGNIFICANT CHANGE UP
EOSINOPHIL # BLD AUTO: 0.18 K/UL — SIGNIFICANT CHANGE UP (ref 0–0.5)
EOSINOPHIL NFR BLD AUTO: 2.3 % — SIGNIFICANT CHANGE UP (ref 0–6)
GLUCOSE SERPL-MCNC: 97 MG/DL — SIGNIFICANT CHANGE UP (ref 70–99)
HCT VFR BLD CALC: 39 % — SIGNIFICANT CHANGE UP (ref 39–50)
HGB BLD-MCNC: 13.3 G/DL — SIGNIFICANT CHANGE UP (ref 13–17)
IANC: 4.89 K/UL — SIGNIFICANT CHANGE UP (ref 1.8–7.4)
IMM GRANULOCYTES NFR BLD AUTO: 0.6 % — SIGNIFICANT CHANGE UP (ref 0–0.9)
INR BLD: 2.78 RATIO — HIGH (ref 0.85–1.18)
LYMPHOCYTES # BLD AUTO: 1.97 K/UL — SIGNIFICANT CHANGE UP (ref 1–3.3)
LYMPHOCYTES # BLD AUTO: 24.7 % — SIGNIFICANT CHANGE UP (ref 13–44)
MCHC RBC-ENTMCNC: 26.7 PG — LOW (ref 27–34)
MCHC RBC-ENTMCNC: 34.1 GM/DL — SIGNIFICANT CHANGE UP (ref 32–36)
MCV RBC AUTO: 78.3 FL — LOW (ref 80–100)
MONOCYTES # BLD AUTO: 0.82 K/UL — SIGNIFICANT CHANGE UP (ref 0–0.9)
MONOCYTES NFR BLD AUTO: 10.3 % — SIGNIFICANT CHANGE UP (ref 2–14)
NEUTROPHILS # BLD AUTO: 4.89 K/UL — SIGNIFICANT CHANGE UP (ref 1.8–7.4)
NEUTROPHILS NFR BLD AUTO: 61.3 % — SIGNIFICANT CHANGE UP (ref 43–77)
NRBC # BLD: 0 /100 WBCS — SIGNIFICANT CHANGE UP (ref 0–0)
NRBC # FLD: 0 K/UL — SIGNIFICANT CHANGE UP (ref 0–0)
PLATELET # BLD AUTO: 196 K/UL — SIGNIFICANT CHANGE UP (ref 150–400)
POTASSIUM SERPL-MCNC: 4.4 MMOL/L — SIGNIFICANT CHANGE UP (ref 3.5–5.3)
POTASSIUM SERPL-SCNC: 4.4 MMOL/L — SIGNIFICANT CHANGE UP (ref 3.5–5.3)
PROT SERPL-MCNC: 7.1 G/DL — SIGNIFICANT CHANGE UP (ref 6–8.3)
PROTHROM AB SERPL-ACNC: 30.2 SEC — HIGH (ref 9.5–13)
RBC # BLD: 4.98 M/UL — SIGNIFICANT CHANGE UP (ref 4.2–5.8)
RBC # FLD: 15.9 % — HIGH (ref 10.3–14.5)
SODIUM SERPL-SCNC: 126 MMOL/L — LOW (ref 135–145)
WBC # BLD: 7.97 K/UL — SIGNIFICANT CHANGE UP (ref 3.8–10.5)
WBC # FLD AUTO: 7.97 K/UL — SIGNIFICANT CHANGE UP (ref 3.8–10.5)

## 2024-09-19 PROCEDURE — 99239 HOSP IP/OBS DSCHRG MGMT >30: CPT

## 2024-09-19 PROCEDURE — 99231 SBSQ HOSP IP/OBS SF/LOW 25: CPT | Mod: FS

## 2024-09-19 RX ORDER — WARFARIN SODIUM 2 MG
1.5 TABLET ORAL ONCE
Refills: 0 | Status: DISCONTINUED | OUTPATIENT
Start: 2024-09-19 | End: 2024-09-19

## 2024-09-19 RX ORDER — RIVAROXABAN 10 MG/1
15 TABLET, FILM COATED ORAL
Refills: 0 | Status: DISCONTINUED | OUTPATIENT
Start: 2024-09-19 | End: 2024-09-19

## 2024-09-19 RX ORDER — RIVAROXABAN 10 MG/1
1 TABLET, FILM COATED ORAL
Qty: 30 | Refills: 0
Start: 2024-09-19 | End: 2024-10-18

## 2024-09-19 NOTE — DISCHARGE NOTE NURSING/CASE MANAGEMENT/SOCIAL WORK - NSDCPEFALRISK_GEN_ALL_CORE
For information on Fall & Injury Prevention, visit: [email or web address] OR  [email or web address] OR  https://www.cdc.gov/steadi/patient.html

## 2024-09-19 NOTE — DISCHARGE NOTE PROVIDER - NSDCMRMEDTOKEN_GEN_ALL_CORE_FT
ALPRAZolam 0.5 mg oral tablet: 1 tab(s) orally once a day (at bedtime)  Coumadin 2 mg oral tablet: 1 tab(s) orally once a day (at bedtime)  furosemide: 20 milligram(s) once a day  losartan: 100 milligram(s) once a day   ALPRAZolam 0.5 mg oral tablet: 1 tab(s) orally once a day (at bedtime)  furosemide: 20 milligram(s) once a day  losartan: 100 milligram(s) once a day  rivaroxaban 15 mg oral tablet: 1 tab(s) orally once a day (before a meal)

## 2024-09-19 NOTE — ED ADULT NURSE REASSESSMENT NOTE - NS ED NURSE REASSESS COMMENT FT1
Break Coverage RN: Pt resting comfortably at this time, respirations even and unlabored, appears in NAD. Patient bradycardic to 30s on cardiac monitor and on zoll. Patient asymptomatic at this time. No complaints of chest pain, headache, nausea, dizziness, vomiting  SOB, fever, chills verbalized. previous 18GRAC discontinued and new 20GIV in place to RAC. morning labs sent, safety measures in place.
Patient resting in stretcher, at baseline mental status, no acute distress noted at this time. Respirations equal and unlabored. Pt on cardiac monitor, sinus bradycardia noted. Pt also on Zoll. Pt placed on bedpan, pt unable to have a bowel movement. Care plan continued. Comfort measures provided. Safety maintained. Awaiting lab results.
Pt resting in stretcher, no acute distress noted. Pt remains bradycardic on monitor, offers no complaints at this time. morning labwork drawn and sent. respirations even and unlabored. safety maintained, comfort measures provided.
Report received from FLOR Mohamud. Pt A&Ox3. Pt resting in stretcher, daughter at bedside. Pt primarily Luxembourgish speaking, daughter translating as needed. Pt offers no complaints at this time, denies SOB, headache, dizziness, chest pain verbalized. Pt noted to be bradycardic on CM. Pt on zoll, at bedside. VS as noted. respirations even and unlabored. safety maintained, call bell in reach
report received from night RN Lara at shift change. pt awake and alert, A+Ox2, resting comfortably. respirations even and unlabored. daughter at bedside for translation. pt denies chest pain/sob/palpitations. vs as noted. pt awaiting cardiology follow up evaluation. skin warm dry and intact. call bell within reach. safety measures maintained. will monitor.
Break RN: pt received in room 15. Pt in no apparent distress. Respiration even and non-labored. Noted to be bradycardia to 30, MD Collier aware, plan for tele, pt remains on zoll.

## 2024-09-19 NOTE — DISCHARGE NOTE NURSING/CASE MANAGEMENT/SOCIAL WORK - PATIENT PORTAL LINK FT
You can access the FollowMyHealth Patient Portal offered by [facility] by registering at the following website: [email or web address]. By joining [facility]’s FollowMyHealth portal, you will also be able to view your health information using other applications (apps) compatible with our system.

## 2024-09-19 NOTE — DISCHARGE NOTE PROVIDER - HOSPITAL COURSE
HPI:  89M with PMH of DVT and atrial fibrillation on coumadin, HTN, gout transferred from [facility] for EP evaluation after patient was found to be bradycardic to 30's. Daughter at bedside able to give history. Per daughter, patient is usually taken care of by his two daughters, however they report they have had more difficulty in taking care of the patient and submitted a request for hospice evaluation for patient to obtain more resources for care. During the hospice evaluation today he was found to have heart rate in the 30s to 40s and was recommended to come to the ED.  Per family over the past month the patient has been more fatigued and unable to participate in ADL's and was previously able to ambulate. Patient was transferred from [facility] to [facility] ED for cardiac evaluation. EP consulted and saw patient - EKG noted fo aflutter w/ slow ventricular response - will have discussion with family about potential device placement. (18 Sep 2024 04:32)    Hospital Course:      Important Medication Changes and Reason:    Active or Pending Issues Requiring Follow-up:    Advanced Directives:   [ ] Full code  [ x] DNR  [x ] Hospice    Discharge Diagnoses:  Slow atrial fibrillation  Bradycardia  HTN  Dementia  Hx of DVT  Hypercoagulable state in setting of DVT/decrease mobility.  Hx of gout     HPI:  89M with PMH of DVT and atrial fibrillation on coumadin, HTN, gout transferred from [facility] for EP evaluation after patient was found to be bradycardic to 30's. Daughter at bedside able to give history. Per daughter, patient is usually taken care of by his two daughters, however they report they have had more difficulty in taking care of the patient and submitted a request for hospice evaluation for patient to obtain more resources for care. During the hospice evaluation today he was found to have heart rate in the 30s to 40s and was recommended to come to the ED.  Per family over the past month the patient has been more fatigued and unable to participate in ADL's and was previously able to ambulate. Patient was transferred from [facility] to [facility] ED for cardiac evaluation. EP consulted and saw patient - EKG noted fo aflutter w/ slow ventricular response - will have discussion with family about potential device placement. (18 Sep 2024 04:32)    Hospital Course:  The patient was seen by EP. He was monitored on tele and HR remained in the 30s-50s. Pt remained HDS. BP stable. Mentating well. Per EP - based on age/comorbidities/overall prognosis - PPM is not recommended. Of note - Patient also switched from Warfarin to Xarelto. This was discussed w/ family who agreed. They will continue w/ current regimen of meds for now. Their primary concern is on pt's comfort. They do not want any escalation of care or any other invasive interventions. Patient now DNR/DNI. Referred to home hospice. VNS to evaluate tomorrow. In the mean time - family wants to take him home ASAP. They do not want him to stay another night with the understanding that VNS will see pt 9/20. Per family - they have established contact w/ VNS team. Any further adjustments of meds to be considered by the oncoming hospice team.     As such - will DC home w/ transport. MOLST completed.    Important Medication Changes and Reason:  DC Warfarin. Start Xarelto.    Active or Pending Issues Requiring Follow-up:  Hospice team.     Advanced Directives:   [ ] Full code  [ x] DNR  [x ] Hospice    Discharge Diagnoses:  Slow atrial fibrillation  Bradycardia  HTN  Dementia  Hx of DVT  Hypercoagulable state in setting of DVT/decrease mobility.  Hx of gout     HPI:  89M with PMH of DVT and atrial fibrillation on coumadin, HTN, gout transferred from [facility] for EP evaluation after patient was found to be bradycardic to 30's. Daughter at bedside able to give history. Per daughter, patient is usually taken care of by his two daughters, however they report they have had more difficulty in taking care of the patient and submitted a request for hospice evaluation for patient to obtain more resources for care. During the hospice evaluation today he was found to have heart rate in the 30s to 40s and was recommended to come to the ED.  Per family over the past month the patient has been more fatigued and unable to participate in ADL's and was previously able to ambulate. Patient was transferred from [facility] to [facility] ED for cardiac evaluation. EP consulted and saw patient - EKG noted fo aflutter w/ slow ventricular response - will have discussion with family about potential device placement. (18 Sep 2024 04:32)    Hospital Course:  The patient was seen by EP. He was monitored on tele and HR remained in the 30s-50s. Pt remained HDS. BP stable. Mentating well. Per EP - based on age/comorbidities/overall prognosis - PPM is not recommended. Of note - Patient also switched from Warfarin to Xarelto. This was discussed w/ family who agreed. They will continue w/ current regimen of meds for now. Their primary concern is on pt's comfort. They do not want any escalation of care or any other invasive interventions. Patient now DNR/DNI. Referred to home hospice. VNS to evaluate tomorrow. In the mean time - family wants to take him home ASAP. They do not want him to stay another night with the understanding that VNS will see pt 9/20. Per family - they have established contact w/ VNS team. Any further adjustments of meds to be considered by the oncoming hospice team.     As such - will DC home w/ transport. MOLST completed.       Important Medication Changes and Reason:  DC Warfarin. Start Xarelto.    Active or Pending Issues Requiring Follow-up:  Hospice team.     Advanced Directives:   [ ] Full code  [ x] DNR  [x ] Hospice    Discharge Diagnoses:  Slow atrial fibrillation  Bradycardia  HTN  Dementia  Hx of DVT  Hypercoagulable state in setting of DVT/decrease mobility.  Hx of gout

## 2024-09-19 NOTE — PROGRESS NOTE ADULT - NS ATTEND AMEND GEN_ALL_CORE FT
Pt being transitioned to comfort care. EP to sign off.
89-year-old Malian speaking M w/ pmh of DVTs, atrial fibrillation on coumadin, HTN, and Gout txf from [facility] for EP eval for slow Afib/flutter. Patient has notable deterioration in functional status with inability to ADLs and is undergoing hospice referral. Electrophysiology consulted for slow Afib/Aflutter. Patient overall not doing well. Not sure PPM is within GOC. Was being worked up for Hospice. Would consult Palliative. He has been functionally declining, and pacing is not going to change that outcome.

## 2024-09-19 NOTE — DISCHARGE NOTE PROVIDER - NSDCCPCAREPLAN_GEN_ALL_CORE_FT
PRINCIPAL DISCHARGE DIAGNOSIS  Diagnosis: Atrial fibrillation with slow ventricular response  Assessment and Plan of Treatment: You were found to have a slow heart rate, likely contributing to your symptoms. You were seen by electrophysiology. After considering the risk benefits of intervention and your underlying history - pacemaker placement was not recommended. At this time - the plan is to transition to hospice care. Primary focus of treatment is on comfort and symptom management.   As discussed with your family - we have transitioned you to comfort measures only. MOLST form was completed and you are DNR/DNI.   Continue to monitor INR      SECONDARY DISCHARGE DIAGNOSES  Diagnosis: History of DVT (deep vein thrombosis)  Assessment and Plan of Treatment: You have been on warfarin for anticoagulation. Given need for repeated blood draws and potential for adverse interaction - decision was made to transition your to another anticoagulant (Xarelto). Please take as prescribed (refer to medicatin list).     PRINCIPAL DISCHARGE DIAGNOSIS  Diagnosis: Atrial fibrillation with slow ventricular response  Assessment and Plan of Treatment: You were found to have a slow heart rate, likely contributing to your symptoms. You were seen by electrophysiology. After considering the risk benefits of intervention and your underlying history - pacemaker placement was not recommended. At this time - the plan is to transition to hospice care. Primary focus of treatment is on comfort and symptom management.   As discussed with your family - we have transitioned you to comfort measures only. MOLST form was completed and you are DNR/DNI.   Please take Xarelto as discussed. We have obtained a 1 month free supply through our pharmacy. Please follow up with hospice team within this period to decide on next steps.      SECONDARY DISCHARGE DIAGNOSES  Diagnosis: History of DVT (deep vein thrombosis)  Assessment and Plan of Treatment: You have been on warfarin for anticoagulation. Given need for repeated blood draws and potential for adverse interaction - decision was made to transition your to another anticoagulant (Xarelto). Please take as prescribed (refer to medicatin list). Your insurance requires a copay for the medication. We have obtained a 1 month free supply through our pharmacy. Please follow up with hospice team within this period to decide on next steps.    Diagnosis: Hypertension  Assessment and Plan of Treatment: You may continue with losartan and furosemide for now. Please follow up with the hospice team for further guidance.

## 2024-09-19 NOTE — PROGRESS NOTE ADULT - SUBJECTIVE AND OBJECTIVE BOX
Interval History:  No acute events overnight    MEDICATIONS  (STANDING):  ALPRAZolam 0.5 milliGRAM(s) Oral every 24 hours  warfarin 1.5 milliGRAM(s) Oral once    MEDICATIONS  (PRN):    Vital Signs Last 24 Hrs  T(C): 36.6 (09-19-24 @ 07:40), Max: 37.1 (09-18-24 @ 16:25)  T(F): 97.9 (09-19-24 @ 07:40), Max: 98.7 (09-18-24 @ 16:25)  HR: 37 (09-19-24 @ 07:40) (35 - 52)  BP: 141/63 (09-19-24 @ 07:40) (122/51 - 145/70)  BP(mean): --  RR: 17 (09-19-24 @ 07:40) (15 - 17)  SpO2: 99% (09-19-24 @ 07:40) (97% - 100%)    LABS:	 	  CBC Full  -  ( 19 Sep 2024 05:25 )  WBC Count : 7.97 K/uL  Hemoglobin : 13.3 g/dL  Hematocrit : 39.0 %  Platelet Count - Automated : 196 K/uL  Mean Cell Volume : 78.3 fL  Mean Cell Hemoglobin : 26.7 pg  Mean Cell Hemoglobin Concentration : 34.1 gm/dL  Auto Neutrophil # : 4.89 K/uL  Auto Lymphocyte # : 1.97 K/uL  Auto Monocyte # : 0.82 K/uL  Auto Eosinophil # : 0.18 K/uL  Auto Basophil # : 0.06 K/uL  Auto Neutrophil % : 61.3 %  Auto Lymphocyte % : 24.7 %  Auto Monocyte % : 10.3 %  Auto Eosinophil % : 2.3 %  Auto Basophil % : 0.8 %    09-19    126[L]  |  92[L]  |  16  ----------------------------<  97  4.4   |  20[L]  |  1.05  09-18    126[L]  |  94[L]  |  21  ----------------------------<  93  4.0   |  21[L]  |  1.17    Ca    8.7      19 Sep 2024 05:25  Ca    8.5      18 Sep 2024 05:32  Phos  3.0     09-18  Mg     1.80     09-18  Mg     2.0     09-17    TPro  7.1  /  Alb  3.5  /  TBili  2.0[H]  /  DBili  x   /  AST  31  /  ALT  20  /  AlkPhos  105  09-19  TPro  6.1  /  Alb  3.2[L]  /  TBili  1.6[H]  /  DBili  x   /  AST  30  /  ALT  16  /  AlkPhos  100  09-18    PT/INR - ( 19 Sep 2024 05:25 )   PT: 30.2 sec;   INR: 2.78 ratio         PTT - ( 18 Sep 2024 05:32 )  PTT:34.6 sec    LIVER FUNCTIONS - ( 19 Sep 2024 05:25 )  Alb: 3.5 g/dL / Pro: 7.1 g/dL / ALK PHOS: 105 U/L / ALT: 20 U/L / AST: 31 U/L / GGT: x

## 2024-09-19 NOTE — DISCHARGE NOTE PROVIDER - CARE PROVIDER_API CALL
RAJI ARNETT  [address]  [place] [address]  Phone: [phone]  Fax: [phone]  Established Patient  Follow Up Time:

## 2024-09-19 NOTE — PROGRESS NOTE ADULT - ASSESSMENT
89-year-old Maltese speaking M w/ pmh of DVTs, atrial fibrillation on coumadin, HTN, and Gout txf from [facility] for EP eval for slow Afib/flutter. Patient has notable deterioration in functional status with inability to ADLs and is undergoing hospice referral. Electrophysiology consulted for slow Afib/Aflutter.     EK/17: Slow flutter w/ rate of 37  TTE: LVEF 55%, Trace MR, Grossly normal LV function.   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 QRS complex is wide. The patient's rates are low and this is likely in the setting of long standing atrial fibrillation and conduction burnout. Per family, was dx >20 years ago. Guidelines recommend consideration of device placement for slow aflutter/Afib in this setting, but device insertion is not recommended in patients with a life expectancy <1 year which is a consideration in this case. Although treatment options could include Palliative vs., DCCV vs. Pacemaker placement. DCCV may result in asystole depending on severity of underlying conduction disease. Pacemaker could be considered but patient has deteriorating functional status, poor po intake with 1 month of decline w/o clear cause which is consistent with life expectancy <1 year and given this procedural risk and post procedural infectious risk would be elevated. Palliative approach for now seems the most reasonable. All options were discussed with the family during the time of assessment with the patients wife and 2 daughters.     Recommendations   #Atrial fibrillation/Atrial flutter with slow ventricular response   - Rhythm/Rate Control: Avoid any rate and rhythm control at this time given slow rates.   - AC: INR 2.78 today - continue coumadin   - Replete K >4 and Mg >2

## 2024-09-19 NOTE — DISCHARGE NOTE PROVIDER - ATTENDING DISCHARGE PHYSICAL EXAMINATION:
Pt seen and evaluated at bedside this AM. no o/n events. Denies any SOB/CP/NV. Family at bedside. Pt calm/cooperative.    Gen- In bed, NAD  Resp- CTAB, good effort.   CVS- RRR, S1S2. No g/r/m.  GI- Soft abd, NT, ND, +BSx4  Ext- No C/C.   Neuro- CN II-XII intact. Speech fluent/face symmetric.

## 2024-10-08 NOTE — PATIENT PROFILE ADULT - PATIENT REPRESENTATIVE: ( YOU CAN CHOOSE ANY PERSON THAT CAN ASSIST YOU WITH YOUR HEALTH CARE PREFERENCES, DOES NOT HAVE TO BE A SPOUSE, IMMEDIATE FAMILY OR SIGNIFICANT OTHER/PARTNER)
Class III - visualization of the soft palate and the base of the uvula (3) adequate information could not be obtained

## 2025-03-09 ENCOUNTER — INPATIENT (INPATIENT)
Facility: HOSPITAL | Age: 89
LOS: 3 days | Discharge: NURSING, REHAB OR LONG-TERM CARE | DRG: 291 | End: 2025-03-13
Attending: STUDENT IN AN ORGANIZED HEALTH CARE EDUCATION/TRAINING PROGRAM | Admitting: STUDENT IN AN ORGANIZED HEALTH CARE EDUCATION/TRAINING PROGRAM
Payer: MEDICARE

## 2025-03-09 VITALS
WEIGHT: 128.09 LBS | SYSTOLIC BLOOD PRESSURE: 148 MMHG | HEIGHT: 65 IN | TEMPERATURE: 99 F | OXYGEN SATURATION: 100 % | HEART RATE: 47 BPM | DIASTOLIC BLOOD PRESSURE: 89 MMHG | RESPIRATION RATE: 16 BRPM

## 2025-03-09 DIAGNOSIS — Z98.890 OTHER SPECIFIED POSTPROCEDURAL STATES: Chronic | ICD-10-CM

## 2025-03-09 DIAGNOSIS — Z90.79 ACQUIRED ABSENCE OF OTHER GENITAL ORGAN(S): Chronic | ICD-10-CM

## 2025-03-09 PROCEDURE — 99285 EMERGENCY DEPT VISIT HI MDM: CPT | Mod: GW

## 2025-03-09 PROCEDURE — 93010 ELECTROCARDIOGRAM REPORT: CPT

## 2025-03-09 NOTE — ED ADULT TRIAGE NOTE - CHIEF COMPLAINT QUOTE
Standing on the bathroom and loss balance and fell no LOC with right rib pain happened 1 hour ago no LOC pt on Hospice Care  with HR on 40's most of the time per daughter on Xarelto

## 2025-03-09 NOTE — ED ADULT NURSE NOTE - OBJECTIVE STATEMENT
patient s/p fall in the bathroom and hit right side of the chest around 10:30pm. patient c/o right rib pain. denies LOC and head trauma. medical hx of DVT x3, COPD, heart failure and put on hospice care as of 09/2024. per patient daughter, patient is on blood thinner xarelto. patient is on O2 as needed at home. weakness noted.

## 2025-03-09 NOTE — ED ADULT NURSE NOTE - NSFALLHARMRISKINTERV_ED_ALL_ED
Assistance OOB with selected safe patient handling equipment if applicable/Assistance with ambulation/Communicate risk of Fall with Harm to all staff, patient, and family/Monitor gait and stability/Provide visual cue: red socks, yellow wristband, yellow gown, etc/Reinforce activity limits and safety measures with patient and family/Bed in lowest position, wheels locked, appropriate side rails in place/Call bell, personal items and telephone in reach/Instruct patient to call for assistance before getting out of bed/chair/stretcher/Non-slip footwear applied when patient is off stretcher/[place] to call system/Physically safe environment - no spills, clutter or unnecessary equipment/Purposeful Proactive Rounding/Room/bathroom lighting operational, light cord in reach

## 2025-03-10 DIAGNOSIS — R09.89 OTHER SPECIFIED SYMPTOMS AND SIGNS INVOLVING THE CIRCULATORY AND RESPIRATORY SYSTEMS: ICD-10-CM

## 2025-03-10 DIAGNOSIS — I82.409 ACUTE EMBOLISM AND THROMBOSIS OF UNSPECIFIED DEEP VEINS OF UNSPECIFIED LOWER EXTREMITY: ICD-10-CM

## 2025-03-10 DIAGNOSIS — J18.9 PNEUMONIA, UNSPECIFIED ORGANISM: ICD-10-CM

## 2025-03-10 DIAGNOSIS — Z75.8 OTHER PROBLEMS RELATED TO MEDICAL FACILITIES AND OTHER HEALTH CARE: ICD-10-CM

## 2025-03-10 DIAGNOSIS — R53.81 OTHER MALAISE: ICD-10-CM

## 2025-03-10 DIAGNOSIS — Z29.9 ENCOUNTER FOR PROPHYLACTIC MEASURES, UNSPECIFIED: ICD-10-CM

## 2025-03-10 DIAGNOSIS — I10 ESSENTIAL (PRIMARY) HYPERTENSION: ICD-10-CM

## 2025-03-10 DIAGNOSIS — Z51.5 ENCOUNTER FOR PALLIATIVE CARE: ICD-10-CM

## 2025-03-10 DIAGNOSIS — S22.39XA FRACTURE OF ONE RIB, UNSPECIFIED SIDE, INITIAL ENCOUNTER FOR CLOSED FRACTURE: ICD-10-CM

## 2025-03-10 DIAGNOSIS — I48.91 UNSPECIFIED ATRIAL FIBRILLATION: ICD-10-CM

## 2025-03-10 DIAGNOSIS — I50.9 HEART FAILURE, UNSPECIFIED: ICD-10-CM

## 2025-03-10 DIAGNOSIS — S22.32XA FRACTURE OF ONE RIB, LEFT SIDE, INITIAL ENCOUNTER FOR CLOSED FRACTURE: ICD-10-CM

## 2025-03-10 LAB
ALBUMIN SERPL ELPH-MCNC: 2.6 G/DL — LOW (ref 3.5–5)
ALP SERPL-CCNC: 140 U/L — HIGH (ref 40–120)
ALT FLD-CCNC: 29 U/L DA — SIGNIFICANT CHANGE UP (ref 10–60)
ANION GAP SERPL CALC-SCNC: 8 MMOL/L — SIGNIFICANT CHANGE UP (ref 5–17)
AST SERPL-CCNC: 40 U/L — SIGNIFICANT CHANGE UP (ref 10–40)
BASOPHILS # BLD AUTO: 0.04 K/UL — SIGNIFICANT CHANGE UP (ref 0–0.2)
BASOPHILS NFR BLD AUTO: 0.6 % — SIGNIFICANT CHANGE UP (ref 0–2)
BILIRUB SERPL-MCNC: 1.1 MG/DL — SIGNIFICANT CHANGE UP (ref 0.2–1.2)
BLD GP AB SCN SERPL QL: SIGNIFICANT CHANGE UP
BUN SERPL-MCNC: 27 MG/DL — HIGH (ref 7–18)
CALCIUM SERPL-MCNC: 8.9 MG/DL — SIGNIFICANT CHANGE UP (ref 8.4–10.5)
CHLORIDE SERPL-SCNC: 99 MMOL/L — SIGNIFICANT CHANGE UP (ref 96–108)
CO2 SERPL-SCNC: 27 MMOL/L — SIGNIFICANT CHANGE UP (ref 22–31)
CREAT SERPL-MCNC: 1.19 MG/DL — SIGNIFICANT CHANGE UP (ref 0.5–1.3)
EGFR: 58 ML/MIN/1.73M2 — LOW
EGFR: 58 ML/MIN/1.73M2 — LOW
EOSINOPHIL # BLD AUTO: 0.29 K/UL — SIGNIFICANT CHANGE UP (ref 0–0.5)
EOSINOPHIL NFR BLD AUTO: 4.6 % — SIGNIFICANT CHANGE UP (ref 0–6)
FLUAV AG NPH QL: SIGNIFICANT CHANGE UP
FLUBV AG NPH QL: SIGNIFICANT CHANGE UP
GLUCOSE SERPL-MCNC: 109 MG/DL — HIGH (ref 70–99)
HCT VFR BLD CALC: 38.7 % — LOW (ref 39–50)
HGB BLD-MCNC: 13.3 G/DL — SIGNIFICANT CHANGE UP (ref 13–17)
IMM GRANULOCYTES NFR BLD AUTO: 1.4 % — HIGH (ref 0–0.9)
LEGIONELLA AG UR QL: NEGATIVE — SIGNIFICANT CHANGE UP
LYMPHOCYTES # BLD AUTO: 0.81 K/UL — LOW (ref 1–3.3)
LYMPHOCYTES # BLD AUTO: 12.7 % — LOW (ref 13–44)
MCHC RBC-ENTMCNC: 28.2 PG — SIGNIFICANT CHANGE UP (ref 27–34)
MCHC RBC-ENTMCNC: 34.4 G/DL — SIGNIFICANT CHANGE UP (ref 32–36)
MCV RBC AUTO: 82.2 FL — SIGNIFICANT CHANGE UP (ref 80–100)
MONOCYTES # BLD AUTO: 1.01 K/UL — HIGH (ref 0–0.9)
MONOCYTES NFR BLD AUTO: 15.9 % — HIGH (ref 2–14)
NEUTROPHILS # BLD AUTO: 4.12 K/UL — SIGNIFICANT CHANGE UP (ref 1.8–7.4)
NEUTROPHILS NFR BLD AUTO: 64.8 % — SIGNIFICANT CHANGE UP (ref 43–77)
NRBC BLD AUTO-RTO: 0 /100 WBCS — SIGNIFICANT CHANGE UP (ref 0–0)
NT-PROBNP SERPL-SCNC: 8984 PG/ML — HIGH (ref 0–450)
PLATELET # BLD AUTO: 156 K/UL — SIGNIFICANT CHANGE UP (ref 150–400)
POTASSIUM SERPL-MCNC: 4.1 MMOL/L — SIGNIFICANT CHANGE UP (ref 3.5–5.3)
POTASSIUM SERPL-SCNC: 4.1 MMOL/L — SIGNIFICANT CHANGE UP (ref 3.5–5.3)
PROT SERPL-MCNC: 6.6 G/DL — SIGNIFICANT CHANGE UP (ref 6–8.3)
RBC # BLD: 4.71 M/UL — SIGNIFICANT CHANGE UP (ref 4.2–5.8)
RBC # FLD: 17.3 % — HIGH (ref 10.3–14.5)
RSV RNA NPH QL NAA+NON-PROBE: SIGNIFICANT CHANGE UP
SARS-COV-2 RNA SPEC QL NAA+PROBE: SIGNIFICANT CHANGE UP
SODIUM SERPL-SCNC: 134 MMOL/L — LOW (ref 135–145)
WBC # BLD: 6.36 K/UL — SIGNIFICANT CHANGE UP (ref 3.8–10.5)
WBC # FLD AUTO: 6.36 K/UL — SIGNIFICANT CHANGE UP (ref 3.8–10.5)

## 2025-03-10 PROCEDURE — 99223 1ST HOSP IP/OBS HIGH 75: CPT

## 2025-03-10 PROCEDURE — 71250 CT THORAX DX C-: CPT | Mod: 26

## 2025-03-10 PROCEDURE — 70450 CT HEAD/BRAIN W/O DYE: CPT | Mod: 26

## 2025-03-10 PROCEDURE — 71045 X-RAY EXAM CHEST 1 VIEW: CPT | Mod: 26

## 2025-03-10 PROCEDURE — 99223 1ST HOSP IP/OBS HIGH 75: CPT | Mod: GC

## 2025-03-10 PROCEDURE — 72125 CT NECK SPINE W/O DYE: CPT | Mod: 26

## 2025-03-10 RX ORDER — LOSARTAN POTASSIUM 100 MG/1
1 TABLET, FILM COATED ORAL
Refills: 0 | DISCHARGE

## 2025-03-10 RX ORDER — FUROSEMIDE 10 MG/ML
40 INJECTION INTRAMUSCULAR; INTRAVENOUS DAILY
Refills: 0 | Status: DISCONTINUED | OUTPATIENT
Start: 2025-03-10 | End: 2025-03-13

## 2025-03-10 RX ORDER — ACETAMINOPHEN 500 MG/5ML
650 LIQUID (ML) ORAL EVERY 6 HOURS
Refills: 0 | Status: DISCONTINUED | OUTPATIENT
Start: 2025-03-10 | End: 2025-03-10

## 2025-03-10 RX ORDER — PREGABALIN 50 MG/1
1 CAPSULE ORAL
Refills: 0 | DISCHARGE

## 2025-03-10 RX ORDER — CEFTRIAXONE 500 MG/1
1000 INJECTION, POWDER, FOR SOLUTION INTRAMUSCULAR; INTRAVENOUS EVERY 24 HOURS
Refills: 0 | Status: DISCONTINUED | OUTPATIENT
Start: 2025-03-10 | End: 2025-03-13

## 2025-03-10 RX ORDER — RIVAROXABAN 10 MG/1
15 TABLET, FILM COATED ORAL DAILY
Refills: 0 | Status: DISCONTINUED | OUTPATIENT
Start: 2025-03-10 | End: 2025-03-13

## 2025-03-10 RX ORDER — ALBUTEROL SULFATE 2.5 MG/3ML
2 VIAL, NEBULIZER (ML) INHALATION EVERY 6 HOURS
Refills: 0 | Status: DISCONTINUED | OUTPATIENT
Start: 2025-03-10 | End: 2025-03-13

## 2025-03-10 RX ORDER — ACETAMINOPHEN 500 MG/5ML
650 LIQUID (ML) ORAL ONCE
Refills: 0 | Status: COMPLETED | OUTPATIENT
Start: 2025-03-10 | End: 2025-03-10

## 2025-03-10 RX ORDER — CEFTRIAXONE 500 MG/1
1000 INJECTION, POWDER, FOR SOLUTION INTRAMUSCULAR; INTRAVENOUS ONCE
Refills: 0 | Status: COMPLETED | OUTPATIENT
Start: 2025-03-10 | End: 2025-03-10

## 2025-03-10 RX ORDER — ALPRAZOLAM 0.5 MG
0.5 TABLET, EXTENDED RELEASE 24 HR ORAL AT BEDTIME
Refills: 0 | Status: DISCONTINUED | OUTPATIENT
Start: 2025-03-10 | End: 2025-03-13

## 2025-03-10 RX ORDER — IPRATROPIUM BROMIDE AND ALBUTEROL SULFATE .5; 2.5 MG/3ML; MG/3ML
3 SOLUTION RESPIRATORY (INHALATION) EVERY 6 HOURS
Refills: 0 | Status: DISCONTINUED | OUTPATIENT
Start: 2025-03-10 | End: 2025-03-13

## 2025-03-10 RX ORDER — PREGABALIN 50 MG/1
25 CAPSULE ORAL DAILY
Refills: 0 | Status: DISCONTINUED | OUTPATIENT
Start: 2025-03-10 | End: 2025-03-13

## 2025-03-10 RX ORDER — IPRATROPIUM BROMIDE AND ALBUTEROL SULFATE .5; 2.5 MG/3ML; MG/3ML
3 SOLUTION RESPIRATORY (INHALATION) ONCE
Refills: 0 | Status: COMPLETED | OUTPATIENT
Start: 2025-03-10 | End: 2025-03-10

## 2025-03-10 RX ORDER — AZITHROMYCIN 250 MG
500 CAPSULE ORAL EVERY 24 HOURS
Refills: 0 | Status: COMPLETED | OUTPATIENT
Start: 2025-03-10 | End: 2025-03-12

## 2025-03-10 RX ORDER — ACETAMINOPHEN 500 MG/5ML
580 LIQUID (ML) ORAL EVERY 4 HOURS
Refills: 0 | Status: DISCONTINUED | OUTPATIENT
Start: 2025-03-10 | End: 2025-03-13

## 2025-03-10 RX ORDER — ACETAMINOPHEN 500 MG/5ML
1000 LIQUID (ML) ORAL ONCE
Refills: 0 | Status: COMPLETED | OUTPATIENT
Start: 2025-03-10 | End: 2025-03-10

## 2025-03-10 RX ORDER — FUROSEMIDE 10 MG/ML
1 INJECTION INTRAMUSCULAR; INTRAVENOUS
Refills: 0 | DISCHARGE

## 2025-03-10 RX ORDER — LIDOCAINE HYDROCHLORIDE 20 MG/ML
1 JELLY TOPICAL DAILY
Refills: 0 | Status: DISCONTINUED | OUTPATIENT
Start: 2025-03-10 | End: 2025-03-13

## 2025-03-10 RX ADMIN — Medication 4 MILLILITER(S): at 11:24

## 2025-03-10 RX ADMIN — RIVAROXABAN 15 MILLIGRAM(S): 10 TABLET, FILM COATED ORAL at 11:13

## 2025-03-10 RX ADMIN — PREGABALIN 25 MILLIGRAM(S): 50 CAPSULE ORAL at 11:13

## 2025-03-10 RX ADMIN — CEFTRIAXONE 1000 MILLIGRAM(S): 500 INJECTION, POWDER, FOR SOLUTION INTRAMUSCULAR; INTRAVENOUS at 03:00

## 2025-03-10 RX ADMIN — IPRATROPIUM BROMIDE AND ALBUTEROL SULFATE 3 MILLILITER(S): .5; 2.5 SOLUTION RESPIRATORY (INHALATION) at 11:13

## 2025-03-10 RX ADMIN — Medication 1000 MILLIGRAM(S): at 05:43

## 2025-03-10 RX ADMIN — LIDOCAINE HYDROCHLORIDE 1 PATCH: 20 JELLY TOPICAL at 11:13

## 2025-03-10 RX ADMIN — Medication 250 MILLIGRAM(S): at 05:42

## 2025-03-10 RX ADMIN — IPRATROPIUM BROMIDE AND ALBUTEROL SULFATE 3 MILLILITER(S): .5; 2.5 SOLUTION RESPIRATORY (INHALATION) at 02:07

## 2025-03-10 RX ADMIN — Medication 650 MILLIGRAM(S): at 04:12

## 2025-03-10 RX ADMIN — CEFTRIAXONE 100 MILLIGRAM(S): 500 INJECTION, POWDER, FOR SOLUTION INTRAMUSCULAR; INTRAVENOUS at 02:21

## 2025-03-10 RX ADMIN — CEFTRIAXONE 100 MILLIGRAM(S): 500 INJECTION, POWDER, FOR SOLUTION INTRAMUSCULAR; INTRAVENOUS at 05:35

## 2025-03-10 RX ADMIN — Medication 400 MILLIGRAM(S): at 04:04

## 2025-03-10 RX ADMIN — Medication 650 MILLIGRAM(S): at 01:19

## 2025-03-10 RX ADMIN — FUROSEMIDE 40 MILLIGRAM(S): 10 INJECTION INTRAMUSCULAR; INTRAVENOUS at 06:24

## 2025-03-10 NOTE — H&P ADULT - CONVERSATION DETAILS
Spoke with daughter Paige at bedside. Inquired about GOC while inpatient given patient is on home hospice and whether patient and family would like to procede with medical treatment or comfort measures only. Paige stated that patient should remain DNR/DNI per his wishes but proceed with medical management including antibiotics, IVF, blood draws, and non/minimally invasive tests that may be required with goal to have patient return to hospice care, most likely at a hospice facility given that family is finding it difficult to manage home hospice presently. When asked specifically about potential for possible procedure like thoracentesis or non-invasive ventilation like NIPPV or high flow nasal canula Paige was undecided and wanted to discuss with her sister before making any decisions. MOLST drafted to reflect DNR/DNI signed and placed in chart. Palliative care to see patient for further GOC discussions and possible placement in hospice facility.

## 2025-03-10 NOTE — ED PROVIDER NOTE - WR ORDER NAME 1
- may have an elongated or misshapen head.  The head is shaped according to the birth canal for easier birth.  This is called molding of the head and will round out in a few days.
Xray Chest 1 View- PORTABLE-Urgent

## 2025-03-10 NOTE — H&P ADULT - PROBLEM SELECTOR PLAN 4
s/p mechanical fall at home   -CT chest shows  Acute to subacute fracture of the left posterolateral 10th rib  -Pain control: tylenol prn, dilaudid PRN for moderate/severe pain  -Lidocaine patch  -incentive spirometry

## 2025-03-10 NOTE — CONSULT NOTE ADULT - SUBJECTIVE AND OBJECTIVE BOX
C A R D I O L O G Y  *********************    DATE OF SERVICE: 03-10-25    HISTORY OF PRESENT ILLNESS:     89y M, from home with hospice, ambulates with cane/walker, with PMH of Asthma, Skin cancer, HTN, Gout,  DVT on xarelto, Afib/flutter with slow ventricular response previously evaluated by EP,  presenting s/p mechanical fall in s/o one day of lethargy and productive cough. He was prescribed antibiotics but the pills were too big for him to take. In the ER patient with 10th rib fracture, ground glass opacities suggestive of pneumonia, and a moderate right pleural effusion new from prior. Pro-BNP of 8,984. The family is interested in placement/rehab for the patient as he is cared for by his two daughters and only has a HHA from 10am to 1pm. The daughters also have to care for his wife and they have their own medical problems including needing a knee replacement next month which makes it difficult for them to be able to continue to care for the couple. Patient's baseline mental status is that he continuously repeats questions as his short term memory is very poor, but he typically knows his surroundings and people.  Cardiology is called as there is concern for CHF      In the ED:  T(F): , Max: 98.6 (22:54); HR:  (33 - 48); BP:  (113/52 - 148/89); RR:  (14 - 16); SpO2:  (97% - 100%)  WBC:6.36, Hb.3, PLT:156  Na:134, K:4.1, Cl:99, HCO3:27, BUN:27, sCr:1.19, Lactate: --  AST:40, ALT:29, ALP:140, Tbili:1.1, Lipase:--   Troponin:--, p-BNP:8984  s/p acetaminophen     Tablet .. 650 milliGRAM(s); acetaminophen   IVPB .. 1000 milliGRAM(s); albuterol/ipratropium for Nebulization. 3 milliLiter(s); cefTRIAXone   IVPB 1000 milliGRAM(s) (10 Mar 2025 05:01)      PAST MEDICAL & SURGICAL HISTORY:  Asthma  Skin cancer  HTN (hypertension)  Gout  DVT (deep venous thrombosis)  Afib  on coumadin  S/P TURP  S/P inguinal hernia repair        MEDICATIONS:  MEDICATIONS  (STANDING):  albuterol    90 MICROgram(s) HFA Inhaler 2 Puff(s) Inhalation every 6 hours  albuterol/ipratropium for Nebulization 3 milliLiter(s) Nebulizer every 6 hours  ALPRAZolam 0.5 milliGRAM(s) Oral at bedtime  azithromycin  IVPB 500 milliGRAM(s) IV Intermittent every 24 hours  cefTRIAXone   IVPB 1000 milliGRAM(s) IV Intermittent every 24 hours  furosemide   Injectable 40 milliGRAM(s) IV Push daily  lidocaine   4% Patch 1 Patch Transdermal daily  pregabalin 25 milliGRAM(s) Oral daily  rivaroxaban 15 milliGRAM(s) Oral daily  sodium chloride 3%  Inhalation 4 milliLiter(s) Inhalation every 6 hours      Allergies    No Known Allergies    Intolerances        FAMILY HISTORY:    Non-contributary for premature coronary disease or sudden cardiac death    SOCIAL HISTORY:    [ X] Non-smoker  [ ] Smoker  [ ] Alcohol    REVIEW OF SYSTEMS:  [ ]chest pain  [  ]shortness of breath  [  ]palpitations  [  ]syncope  [ ]near syncope [ ]upper extremity weakness   [ ] lower extremity weakness  [  ]diplopia  [  ]altered mental status   [  ]fevers  [ ]chills [ ]nausea  [ ]vomitting  [  ]dysphagia    [ ]abdominal pain  [ ]melena  [ ]BRBPR    [  ]epistaxis  [  ]rash    [ ]lower extremity edema        [X] All others negative	  [ ] Unable to obtain      LABS:	 	    CARDIAC MARKERS:                            13.3   6.36  )-----------( 156      ( 10 Mar 2025 01:05 )             38.7     Hb Trend: 13.3<--    03-10    134[L]  |  99  |  27[H]  ----------------------------<  109[H]  4.1   |  27  |  1.19    Ca    8.9      10 Mar 2025 01:05    TPro  6.6  /  Alb  2.6[L]  /  TBili  1.1  /  DBili  x   /  AST  40  /  ALT  29  /  AlkPhos  140[H]  03-10    Creatinine Trend: 1.19<--          PHYSICAL EXAM:  T(C): 36.5 (03-10-25 @ 07:34), Max: 37 (25 @ 22:54)  HR: 48 (03-10-25 @ 07:34) (33 - 48)  BP: 104/59 (03-10-25 @ 07:34) (102/58 - 148/89)  RR: 16 (03-10-25 @ 07:34) (14 - 16)  SpO2: 97% (03-10-25 @ 07:34) (96% - 100%)  Wt(kg): --   BMI (kg/m2): 21.3 (25 @ 22:54)  I&O's Summary    HEENT:  (-)icterus (-)pallor  CV: N S1 S2 1/6 DIEGO (+)2 Pulses B/l  Resp:  Clear to ausculatation B/L, normal effort  GI: (+) BS Soft, NT, ND  Lymph:  (+)Edema, (-)obvious lymphadenopathy  Skin: Warm to touch, Normal turgor  Psych: Appropriate mood and affect        ECG:  	Afib 33 BPM, RBBB    RADIOLOGY:         CXR:  Heart not accurately evaluated on this projection. Moderate hazy right   pleural effusion with underlying right basilar atelectasis. Generalized   nonspecific interstitial prominence. The left 10th rib fracture seen to   better advantage on the CT of the same day. The no obvious pneumothorax.   Please refer to CT chest report same day        ASSESSMENT/PLAN: 	89y Male PMH of Asthma, Skin cancer, HTN, Gout,  DVT on xarelto, Afib/flutter with slow ventricular response previously evaluated by EP,  presenting s/p mechanical fall in s/o one day of lethargy and productive cough concern for PNA and CHF    # GOC  - f/u palliative eval to clarify goals of care    # Pulmonary edema  -  would check echo if within Goals of care  - gentle diuresis  - check prealbumin as malnutrition and low oncotic pressure can cause effusions and pulmonary edema via third spacing     I once again thank you for allowing me to participate in the care of your patient.  If you have any questions or concerns please do not hesitate to contact me.    Juan Neri MD, [facility]  BEEPER [phone]        C A R D I O L O G Y  *********************    DATE OF SERVICE: 03-10-25    HISTORY OF PRESENT ILLNESS:     89y M, from home with hospice, ambulates with cane/walker, with PMH of Asthma, Skin cancer, HTN, Gout,  DVT on xarelto, Afib/flutter with slow ventricular response previously evaluated by EP,  presenting s/p mechanical fall in s/o one day of lethargy and productive cough. He was prescribed antibiotics but the pills were too big for him to take. In the ER patient with 10th rib fracture, ground glass opacities suggestive of pneumonia, and a moderate right pleural effusion new from prior. Pro-BNP of 8,984. The family is interested in placement/rehab for the patient as he is cared for by his two daughters and only has a HHA from 10am to 1pm. The daughters also have to care for his wife and they have their own medical problems including needing a knee replacement next month which makes it difficult for them to be able to continue to care for the couple. Patient's baseline mental status is that he continuously repeats questions as his short term memory is very poor, but he typically knows his surroundings and people.  Cardiology is called as there is concern for CHF      In the ED:  T(F): , Max: 98.6 (22:54); HR:  (33 - 48); BP:  (113/52 - 148/89); RR:  (14 - 16); SpO2:  (97% - 100%)  WBC:6.36, Hb.3, PLT:156  Na:134, K:4.1, Cl:99, HCO3:27, BUN:27, sCr:1.19, Lactate: --  AST:40, ALT:29, ALP:140, Tbili:1.1, Lipase:--   Troponin:--, p-BNP:8984  s/p acetaminophen     Tablet .. 650 milliGRAM(s); acetaminophen   IVPB .. 1000 milliGRAM(s); albuterol/ipratropium for Nebulization. 3 milliLiter(s); cefTRIAXone   IVPB 1000 milliGRAM(s) (10 Mar 2025 05:01)      PAST MEDICAL & SURGICAL HISTORY:  Asthma  Skin cancer  HTN (hypertension)  Gout  DVT (deep venous thrombosis)  Afib  on coumadin  S/P TURP  S/P inguinal hernia repair        MEDICATIONS:  MEDICATIONS  (STANDING):  albuterol    90 MICROgram(s) HFA Inhaler 2 Puff(s) Inhalation every 6 hours  albuterol/ipratropium for Nebulization 3 milliLiter(s) Nebulizer every 6 hours  ALPRAZolam 0.5 milliGRAM(s) Oral at bedtime  azithromycin  IVPB 500 milliGRAM(s) IV Intermittent every 24 hours  cefTRIAXone   IVPB 1000 milliGRAM(s) IV Intermittent every 24 hours  furosemide   Injectable 40 milliGRAM(s) IV Push daily  lidocaine   4% Patch 1 Patch Transdermal daily  pregabalin 25 milliGRAM(s) Oral daily  rivaroxaban 15 milliGRAM(s) Oral daily  sodium chloride 3%  Inhalation 4 milliLiter(s) Inhalation every 6 hours      Allergies    No Known Allergies    Intolerances        FAMILY HISTORY:    Non-contributary for premature coronary disease or sudden cardiac death    SOCIAL HISTORY:    [ X] Non-smoker  [ ] Smoker  [ ] Alcohol    REVIEW OF SYSTEMS:  [ ]chest pain  [  ]shortness of breath  [  ]palpitations  [  ]syncope  [ ]near syncope [ ]upper extremity weakness   [ ] lower extremity weakness  [  ]diplopia  [  ]altered mental status   [  ]fevers  [ ]chills [ ]nausea  [ ]vomitting  [  ]dysphagia    [ ]abdominal pain  [ ]melena  [ ]BRBPR    [  ]epistaxis  [  ]rash    [ ]lower extremity edema        [X] All others negative	  [ ] Unable to obtain      LABS:	 	    CARDIAC MARKERS:                            13.3   6.36  )-----------( 156      ( 10 Mar 2025 01:05 )             38.7     Hb Trend: 13.3<--    03-10    134[L]  |  99  |  27[H]  ----------------------------<  109[H]  4.1   |  27  |  1.19    Ca    8.9      10 Mar 2025 01:05    TPro  6.6  /  Alb  2.6[L]  /  TBili  1.1  /  DBili  x   /  AST  40  /  ALT  29  /  AlkPhos  140[H]  03-10    Creatinine Trend: 1.19<--          PHYSICAL EXAM:  T(C): 36.5 (03-10-25 @ 07:34), Max: 37 (25 @ 22:54)  HR: 48 (03-10-25 @ 07:34) (33 - 48)  BP: 104/59 (03-10-25 @ 07:34) (102/58 - 148/89)  RR: 16 (03-10-25 @ 07:34) (14 - 16)  SpO2: 97% (03-10-25 @ 07:34) (96% - 100%)  Wt(kg): --   BMI (kg/m2): 21.3 (25 @ 22:54)  I&O's Summary    HEENT:  (-)icterus (-)pallor  CV: N S1 S2 1/6 DIEGO (+)2 Pulses B/l  Resp:  Clear to ausculatation B/L, normal effort  GI: (+) BS Soft, NT, ND  Lymph:  (+)Edema, (-)obvious lymphadenopathy  Skin: Warm to touch, Normal turgor  Psych: Appropriate mood and affect        ECG:  	Afib 33 BPM, RBBB    RADIOLOGY:         CXR:  Heart not accurately evaluated on this projection. Moderate hazy right   pleural effusion with underlying right basilar atelectasis. Generalized   nonspecific interstitial prominence. The left 10th rib fracture seen to   better advantage on the CT of the same day. The no obvious pneumothorax.   Please refer to CT chest report same day        ASSESSMENT/PLAN: 	89y Male PMH of Asthma, Skin cancer, HTN, Gout,  DVT on xarelto, Afib/flutter with slow ventricular response previously evaluated by EP,  presenting s/p mechanical fall in s/o one day of lethargy and productive cough concern for PNA and CHF    # GOC  - f/u palliative eval to clarify goals of care    # Pulmonary edema  -  would check echo if within Goals of care  - gentle diuresis  - check prealbumin as malnutrition and low oncotic pressure can cause effusions and pulmonary edema via third spacing     # Afib  - cont xarelto  - monitor HR    I once again thank you for allowing me to participate in the care of your patient.  If you have any questions or concerns please do not hesitate to contact me.    Juan Neri MD, [facility]  BEEPER [phone]

## 2025-03-10 NOTE — H&P ADULT - PROBLEM SELECTOR PLAN 1
hx of afib with slow ventricular response baseline HR 30-40s bpm  -CT chest shows cardiomegaly and b/l pleural effusions ( left moderate, right small)  -p-BNP 8984 and 3+ b/l pitting edema on exam  -Last TTE (2021): EF 55%, trace MR  -f/u repeat TTE  -takes lasix 20mg PO qd at home   -start lasix 40mg IV qd  -strict I/Os, daily weight  -Admit to telemetry  -Cardiology to be consulted in AM

## 2025-03-10 NOTE — CONSULT NOTE ADULT - SUBJECTIVE AND OBJECTIVE BOX
[facility] Geriatric and Palliative Consult Service:  Mara Reid DO: cell ([phone])  Bridger Webster MD: cell ([phone])  Andrea Kelly NP: cell ([phone])   Jose Tomas SW: cell ([phone])   Jessica Fleischer-Black, MD: cell ([phone])    Can contact any Palliative Team member via Microsoft Teams for consults and questions    HPI:  89y M, from home with hospice, ambulates with cane/walker, with PMH of Asthma, Skin cancer, HTN, Gout,  DVT on xarelto, Afib/flutter with slow ventricular response presenting s/p mechanical fall in s/o one day of lethargy and productive cough. He was prescribed antibiotics but the pills were too big for him to take. In the ER patient with 10th rib fracture, ground glass opacities suggestive of pneumonia, and a moderate right pleural effusion new from prior. Pro-BNP of 8,984. The family is interested in placement/rehab for the patient as he is cared for by his two daughters and only has a HHA from 10am to 1pm. The daughters also have to care for his wife and they have their own medical problems including needing a knee replacement next month which makes it difficult for them to be able to continue to care for the couple. Patient's baseline mental status is that he continuously repeats questions as his short term memory is very poor, but he typically knows his surroundings and people.      In the ED:  T(F): , Max: 98.6 (22:54); HR:  (33 - 48); BP:  (113/52 - 148/89); RR:  (14 - 16); SpO2:  (97% - 100%)  WBC:6.36, Hb.3, PLT:156  Na:134, K:4.1, Cl:99, HCO3:27, BUN:27, sCr:1.19, Lactate: --  AST:40, ALT:29, ALP:140, Tbili:1.1, Lipase:--   Troponin:--, p-BNP:8984  s/p acetaminophen     Tablet .. 650 milliGRAM(s); acetaminophen   IVPB .. 1000 milliGRAM(s); albuterol/ipratropium for Nebulization. 3 milliLiter(s); cefTRIAXone   IVPB 1000 milliGRAM(s) (10 Mar 2025 05:01)      PAST MEDICAL & SURGICAL HISTORY:  Asthma      Skin cancer      HTN (hypertension)      Gout      DVT (deep venous thrombosis)      Afib  on coumadin      S/P TURP      S/P inguinal hernia repair          SOCIAL HISTORY:    Admitted from:  home on hospice with [facility]     [ none ] Substance abuse, [ none ] Tobacco hx, [ none ] Alcohol hx, [ none ] Home Opioid Hx  Church: [employer]  Language preferred: [place]    FAMILY HISTORY:   unable to obtain from patient due to poor mentation  Baseline ADLs (prior to admission): Dependent    Allergies    No Known Allergies    Intolerances      Review of Systems: All others negative   Present Symptoms: Mild  Pain:             Location -                               Aggravating factors -             Quality -             Radiation -             Timing-             Severity (0-10 scale):             Minimal acceptable level (0-10 scale):  Fatigue:  Nausea:  Lack of Appetite:   SOB: yes  Depression:  Anxiety:  Constipation:     CPOT:    https://ccs-st.org/resources/Documents/[place]%20Analgesia%20Delirium%20in%20CC/CCS%20STH%20Guideline%20template%20CPOT.pdf  PAIN AD Score:   http://geriatrictoolkit.[place].[facility]/cog/painad.pdf (press ctrl +  left click to view)      MEDICATIONS  (STANDING):  albuterol    90 MICROgram(s) HFA Inhaler 2 Puff(s) Inhalation every 6 hours  albuterol/ipratropium for Nebulization 3 milliLiter(s) Nebulizer every 6 hours  ALPRAZolam 0.5 milliGRAM(s) Oral at bedtime  azithromycin  IVPB 500 milliGRAM(s) IV Intermittent every 24 hours  cefTRIAXone   IVPB 1000 milliGRAM(s) IV Intermittent every 24 hours  furosemide   Injectable 40 milliGRAM(s) IV Push daily  lidocaine   4% Patch 1 Patch Transdermal daily  pregabalin 25 milliGRAM(s) Oral daily  rivaroxaban 15 milliGRAM(s) Oral daily  sodium chloride 3%  Inhalation 4 milliLiter(s) Inhalation every 6 hours    MEDICATIONS  (PRN):  acetaminophen   Oral Liquid .. 580 milliGRAM(s) Oral every 4 hours PRN Temp greater or equal to 38C (100.4F), Mild Pain (1 - 3)      PHYSICAL EXAM:  Vital Signs Last 24 Hrs  T(C): 36.5 (10 Mar 2025 07:34), Max: 37 (09 Mar 2025 22:54)  T(F): 97.7 (10 Mar 2025 07:34), Max: 98.6 (09 Mar 2025 22:54)  HR: 48 (10 Mar 2025 07:34) (33 - 48)  BP: 104/59 (10 Mar 2025 07:34) (102/58 - 148/89)  BP(mean): 73 (10 Mar 2025 07:34) (71 - 87)  RR: 16 (10 Mar 2025 07:34) (14 - 16)  SpO2: 97% (10 Mar 2025 07:34) (96% - 100%)    Parameters below as of 10 Mar 2025 07:34  Patient On (Oxygen Delivery Method): room air        General: alert  oriented x 1    lethargic verbal    Palliative Performance Scale/Karnofsky Score: 20%  http://npcrc.org/files/news/palliative_performance_scale_ppsv2.pdf    HEENT: no abnormal lesion  Lungs: CTA b/l  CV: RRR, S1S2  GI: soft non distended non tender  incontinent   : incontinent   Musculoskeletal: weakness x4    fully bedbound/wheelchair bound  Skin: no abnormal skin lesions  Neuro: no deficits  Oral intake ability:  minimal  capability    LABS:                        13.3   6.36  )-----------( 156      ( 10 Mar 2025 01:05 )             38.7     03-10    134[L]  |  99  |  27[H]  ----------------------------<  109[H]  4.1   |  27  |  1.19    Ca    8.9      10 Mar 2025 01:05    TPro  6.6  /  Alb  2.6[L]  /  TBili  1.1  /  DBili  x   /  AST  40  /  ALT  29  /  AlkPhos  140[H]  03-10    Urinalysis Basic - ( 10 Mar 2025 01:05 )    Color: x / Appearance: x / SG: x / pH: x  Gluc: 109 mg/dL / Ketone: x  / Bili: x / Urobili: x   Blood: x / Protein: x / Nitrite: x   Leuk Esterase: x / RBC: x / WBC x   Sq Epi: x / Non Sq Epi: x / Bacteria: x        RADIOLOGY & ADDITIONAL STUDIES: Reviewed

## 2025-03-10 NOTE — ED PROVIDER NOTE - PROGRESS NOTE DETAILS
Christelle BARNES: spoke with Dr Reid regarding disposition of patient given that patient is on hospice, who will call family member Paige at bedside. Pt pending CT imaging/labs Christelle BARNES: CT head and C-spine within normal limits.  CT chest showing rib fracture on the left, and opacification of the middle and lower lobe with right-sided pleural effusion.  Discussed results with patient Paige at bedside and Angela on the phone regarding discharge with antibiotics vs admission.   Also discussed with Dr. Mara Reid, director of hospice/palliative.  Okay to admit patient for medical indication.  Daughters prefer to admit patient as  patient with frequent falls and difficult to take care at home, would be interested in NH placement. Will admit for pneumonia. Christelle BARNES: CT head and C-spine within normal limits.  CT chest showing rib fracture on the left, and opacification of the middle and lower lobe with right-sided pleural effusion.  Discussed results with patient Paige at bedside and Angela on the phone regarding discharge with antibiotics vs admission.   Also discussed with Dr. Mara Reid, director of hospice/palliative; ok to admit patient if he has a medical indication for hospital stay. Daughters prefer to admit patient as  patient with frequent falls and difficult to take care at home, would be interested in NH placement. Will admit for pneumonia.

## 2025-03-10 NOTE — H&P ADULT - PROBLEM SELECTOR PLAN 5
h/o HTN on losartan 100mg qd and lasix 20mg PO qd  -hold home losartan in s/o soft BP  -c/w lasix 40mg IV qd with holding parameters for CHF  -monitor BP  -adjust antihypertensive meds as appropriate

## 2025-03-10 NOTE — H&P ADULT - PROBLEM SELECTOR PLAN 3
p/w lethargy and productive cough x 1 day  -CT chest shows patchy groundglass opacification in the right middle lobe, left lower   lobe, and lingular, likely infectious/inflammatory  -L moderate PLEF simple appearing on POCUS, possible parapneumonic component given unilaterality  -not in respiratory distress and family tentative about possible invasive thoracentesis   -start ceftriaxone 1g IV x 5 days and azithromycin 500mg IV x 3 days  -f/u urine strep/legionella ag, mycoplasma IgM  -airway clearance with duonebs and inhaled hypertonic saline  -mucinex 600mg q12  -incentive spirometry, OOBTC with assistance

## 2025-03-10 NOTE — H&P ADULT - PROBLEM SELECTOR PLAN 7
currently on home hospice at home   -family states that patient should be DNR/DNI but want medical treatment with goal to return to hospice care likely at facility rather than home  -Palliative care, Dr. Reid, consulted in ED  -Case management consult for hospice placement  -c/w home xanax 0.5mg qhs and pregabalin 25mg qd

## 2025-03-10 NOTE — H&P ADULT - PROBLEM SELECTOR PLAN 2
hx of afib with slow ventricular response baseline HR 30-40s bpm  -HR 33-48 with /52 - 148/89 on admission  -EKG shows junctional bradycardia vs A flutter   -previously evaluated by EP at [facility] 9/2024 and not candidate for PPM placement given age and comorbidities  -avoid AV kenna blocking agents  -c/w home xarelto  -monitor on telemetry  -Cardiology consult in AM

## 2025-03-10 NOTE — CONSULT NOTE ADULT - CONVERSATION DETAILS
Spoke with Juana at bedside. Pramod was present but was not able to participate in decision-making as he is very weak and a little confused.    Juana explained that her father has been on home hospice with VNS because of his slow HR. No intervention desired. However, he became acutely short of breath and they felt that home could not offer enough support for his respiratory issues. It also is too much for their mother, who is also frail.     They are hoping that this hospitalization can improve his symptoms enough to allow him to go to a facility where he can resume hospice, ideally with VNS, since they were happy with their care.     While in the hospital, Pramod will receive supportive care with nebulizer treatments but no non-invasive ventilatory support.   Dyspnea can be improved with low dose opioids. Juana is in agreement.     Antibiotics are ok for now but no escalation to ICU care such as vasopressors.    We reviewed the MOLST filled out overnight.     All questions answered and support given. Spoke with Juana at bedside. Pramod was present but was not able to participate in decision-making as he is very weak and a little confused.    Juana is HCP along with her sister Paige.    Juana explained that her father has been on home hospice with [facility] because of his slow HR. No intervention desired. However, he became acutely short of breath and they felt that home could not offer enough support for his respiratory issues. It also is too much for their mother, who is also frail.     They are hoping that this hospitalization can improve his symptoms enough to allow him to go to a facility where he can resume hospice, ideally with [facility], since they were happy with their care.     While in the hospital, Pramod will receive supportive care with nebulizer treatments but no non-invasive ventilatory support.   Dyspnea can be improved with low dose opioids. Juana is in agreement.     Antibiotics are ok for now but no escalation to ICU care such as vasopressors.    We reviewed the MOLST filled out overnight.     Another daughter, Paige, is an alternate HCP    All questions answered and support given.

## 2025-03-10 NOTE — CONSULT NOTE ADULT - ASSESSMENT
89y M, from home with hospice, ambulates with cane/walker, with PMH of Asthma, Skin cancer, HTN, Gout,  DVT on xarelto, Afib/flutter with slow ventricular response presenting s/p mechanical fall in s/o one day of lethargy and productive cough. He was prescribed antibiotics but the pills were too big for him to take. Admitted for PNA and CHF exacerbation.

## 2025-03-10 NOTE — ED PROVIDER NOTE - CLINICAL SUMMARY MEDICAL DECISION MAKING FREE TEXT BOX
89M with PMH of DVT and atrial fibrillation on coumadin, HTN, gout, dementia on home hospice presenting status post fall.   Normotensive, patient bradycardic  HR low 40s on cardiac monitor.  Patient mentating, awake alert.  GCS 14, ANO timesx 2 (baseline per daughter at bedside), b/l breath sounds , secondary survey intact, pelvis stable.  Will obtain CT head given that patient is on Xarelto, CT chest evaluate for any rib fractures.

## 2025-03-10 NOTE — ED PROVIDER NOTE - PHYSICAL EXAMINATION
A primary and secondary survey was performed.     Airway: oropharynx clear  Breathing: normal breath sounds bilaterally, pt phonating well  Circulation: Mentation normal, pulses palpated in all 4 limbs, no obvious active external hemorrhage, lungs/abd/pelvis/legs wo signs of blood accumulation.   Disability: Neuro intact / pupils equal round reactive.   Exposure: No external signs of trauma     Gen: Well appearing not in distress.   Head: NC,AT. No luna sign/raccoon eyes.   ENT:   oropharynx as above, no nasal hemorrhage.   Neck: as above.   Chest: Lung exam- CTAB, no ttp in chest wall.   Cardiac: RRR S1S2, No JVD.   Abd: soft, non-tender. Normal in color.   Pelvis: Stable.   Ext: no ttp in all 4 limbs, rom at shoulder, elbow, hip and knee wnl,   Skin: No Abrasions or lacerations.   Neuro: GCS 14 , Moving 4 limbs, Speech fluid and clear A primary and secondary survey was performed.     Airway: oropharynx clear  Breathing: normal breath sounds bilaterally, pt phonating well  Circulation: Mentation normal, pulses palpated in all 4 limbs, no obvious active external hemorrhage, lungs/abd/pelvis/legs wo signs of blood accumulation.   Disability: Neuro intact / pupils equal round reactive.   Exposure: No external signs of trauma     Gen: Well appearing not in distress.   Head: NC,AT. No luna sign/raccoon eyes.   ENT:   oropharynx as above, no nasal hemorrhage.   Neck: as above.   Chest: Lung exam- CTAB, +L chest wall ttp  Cardiac: RRR S1S2, No JVD.   Abd: soft, non-tender. Normal in color.   Pelvis: Stable.   Ext: no ttp in all 4 limb  Skin: No Abrasions or lacerations.   Neuro: GCS 14 (AOx2 to self and place, baseline pr dtr) , Moving 4 limbs, Speech fluid and clear

## 2025-03-10 NOTE — H&P ADULT - NSHPPHYSICALEXAM_GEN_ALL_CORE
LOS:     VITALS:   T(C): 36.3 (03-10-25 @ 03:52), Max: 37 (03-09-25 @ 22:54)  HR: 37 (03-10-25 @ 03:52) (33 - 48)  BP: 113/52 (03-10-25 @ 03:52) (113/52 - 148/89)  RR: 16 (03-10-25 @ 03:52) (14 - 16)  SpO2: 97% (03-10-25 @ 03:52) (97% - 100%)    GENERAL: NAD, lying in bed comfortably  HEAD:  Atraumatic, Normocephalic  EYES: EOMI, PERRLA, conjunctiva and sclera clear  ENT: Moist mucous membranes  NECK: Supple, No JVD  CHEST/LUNG: Clear to auscultation bilaterally; No rales, rhonchi, wheezing, or rubs. Unlabored respirations  HEART: Regular rate and rhythm; No murmurs, rubs, or gallops  ABDOMEN: BSx4; Soft, nontender, nondistended  EXTREMITIES:  2+ Peripheral Pulses, brisk capillary refill. No clubbing, cyanosis, or edema  NERVOUS SYSTEM:  A&Ox3, no focal deficits   SKIN: No rashes or lesions LOS:     VITALS:   T(C): 36.3 (03-10-25 @ 03:52), Max: 37 (03-09-25 @ 22:54)  HR: 37 (03-10-25 @ 03:52) (33 - 48)  BP: 113/52 (03-10-25 @ 03:52) (113/52 - 148/89)  RR: 16 (03-10-25 @ 03:52) (14 - 16)  SpO2: 97% (03-10-25 @ 03:52) (97% - 100%)    GENERAL: NAD, elderly, frail, lethargic, lying in bed comfortably  HEAD:  Atraumatic, Normocephalic  EYES: EOMI, PERRLA, conjunctiva and sclera clear  ENT: Moist mucous membranes, edentulism   NECK: Supple, No JVD  CHEST/LUNG: Clear to auscultation bilaterally with transmitted upper airway sounds of uncleared secretions; No rales, rhonchi, wheezing, or rubs. Unlabored respirations  HEART: Bradycardic; No murmurs, rubs, or gallops  ABDOMEN: BSx4; Soft, nontender, nondistended  EXTREMITIES:  2+ Peripheral Pulses, brisk capillary refill. No clubbing, cyanosis, b/l 3+ pitting edema b/l LEs  NERVOUS SYSTEM:  A&Ox2, no focal deficits   SKIN: No rashes or lesions

## 2025-03-10 NOTE — H&P ADULT - ASSESSMENT
89y M, from home with hospice, ambulates with cane/walker, with PMH of Asthma, Skin cancer, HTN, Gout,  DVT on xarelto, Afib/flutter with slow ventricular response presenting s/p mechanical fall in s/o one day of lethargy and productive cough. Admitted to telemetry for acute CHF and PNA.

## 2025-03-10 NOTE — CONSULT NOTE ADULT - PROBLEM SELECTOR RECOMMENDATION 5
Pramod Kunz is able to articulate a choice with regards to his care but cannot participate in complicated planning.   Goal is for transition to hospice at a LTC facility  DNR/DNI  No ICU level of care  Does not need telemetry or echo as no intervention (PPM) would be acceptable at this point in his disease trajectory    d/w Dr. Amezquita  HCP: [place]: [phone]  Alternate: [place]: [phone]

## 2025-03-10 NOTE — H&P ADULT - HISTORY OF PRESENT ILLNESS
89y M, from home with hospice, ambulates with cane/walker, with PMH of Asthma, Skin cancer, HTN, Gout,  DVT on xarelto, Afib/flutter with slow ventricular response presenting s/p mechanical fall in s/o one day of lethargy and productive cough.       In the ED:  T(F): , Max: 98.6 (22:54); HR:  (33 - 48); BP:  (113/52 - 148/89); RR:  (14 - 16); SpO2:  (97% - 100%)  WBC:6.36, Hb.3, PLT:156  Na:134, K:4.1, Cl:99, HCO3:27, BUN:27, sCr:1.19, Lactate: --  AST:40, ALT:29, ALP:140, Tbili:1.1, Lipase:--   Troponin:--, p-BNP:8984  s/p acetaminophen     Tablet .. 650 milliGRAM(s); acetaminophen   IVPB .. 1000 milliGRAM(s); albuterol/ipratropium for Nebulization. 3 milliLiter(s); cefTRIAXone   IVPB 1000 milliGRAM(s) 89y M, from home with hospice, ambulates with cane/walker, with PMH of Asthma, Skin cancer, HTN, Gout,  DVT on xarelto, Afib/flutter with slow ventricular response presenting s/p mechanical fall in s/o one day of lethargy and productive cough. He was prescribed antibiotics but the pills were too big for him to take. In the ER patient with 10th rib fracture, ground glass opacities suggestive of pneumonia, and a moderate right pleural effusion new from prior. Pro-BNP of 8,984. The family is interested in placement/rehab for the patient as he is cared for by his two daughters and only has a HHA from 10am to 1pm. The daughters also have to care for his wife and they have their own medical problems including needing a knee replacement next month which makes it difficult for them to be able to continue to care for the couple. Patient's baseline mental status is that he continuously repeats questions as his short term memory is very poor, but he typically knows his surroundings and people.      In the ED:  T(F): , Max: 98.6 (22:54); HR:  (33 - 48); BP:  (113/52 - 148/89); RR:  (14 - 16); SpO2:  (97% - 100%)  WBC:6.36, Hb.3, PLT:156  Na:134, K:4.1, Cl:99, HCO3:27, BUN:27, sCr:1.19, Lactate: --  AST:40, ALT:29, ALP:140, Tbili:1.1, Lipase:--   Troponin:--, p-BNP:8984  s/p acetaminophen     Tablet .. 650 milliGRAM(s); acetaminophen   IVPB .. 1000 milliGRAM(s); albuterol/ipratropium for Nebulization. 3 milliLiter(s); cefTRIAXone   IVPB 1000 milliGRAM(s)

## 2025-03-10 NOTE — PATIENT PROFILE ADULT - NSPROPOAURINARYCATHETER_GEN_A_NUR
Assessment:  33yo female admitted for tx of empyema w/ recent imaging showing a cystic lesion in her pancreas. Patient in need of further imaging to characterize her pancreatic cyst. Recommended MRCP. Discussed findings from previous CT w/ patient and need to get further imaging via MRI.     Plan:  -F/u with MRCP after performed today  -Keep monitoring chest tube output, averaging 400's daily  -Monitor vitals  -Recommend lifestyle modification, regarding reduction of etoh intake  -F/u Ca 19-9 level   -Surgical oncology will continue to follow no

## 2025-03-10 NOTE — H&P ADULT - ATTENDING COMMENTS
Chest X-Ray: Pending official read; on my read there is a moderate right sided pleural effusion with right sided middle lobe infiltrate. Unable to evaluate left costophrenic angle as it is cut off.    EKG: Junctional Bradycardia, IRBBB, RVH, 33 bpm, QTc 489ms, on my read no ST segment elevation or depression, no TWI    89 year old male patient with pmhx hard of hearing, asthma, DVT/PE, slow atrial fibrillation on xarelto, chronic bradycardia, HTN, gout, skin cancer, dementia on home hospice, on chronic xanax who presented to the ER due to a fall.  The patient was in the bathroom and was being helped with the towel rack when he lost his balance and fell and hit his back right side on the tub. The patient at baseline ambulates with a cane and a walker. The patient has been having a cough with yellowish sputum production and lethargy so the family thought he might be sick. He was prescribed antibiotics but the pills were too big for him to take. In the ER patient with 10th rib fracture, ground glass opacities suggestive of pneumonia, and a moderate right pleural effusion new from prior. Pro-BNP of 8,984. The family is interested in placement/rehab for the patient as he is cared for by his two daughters and only has a HHA from 10am to 1pm. The daughters also have to care for his wife and they have their own medical problems including needing a knee replacement next month which makes it difficult for them to be able to continue to care for the couple. Patient's baseline mental status is that he continuously repeats questions as his short term memory is very poor, but he typically knows his surroundings and people.  Review Of Systems included: + fall, + lethargy, + cough with yellowish phlegm production, no shortness of breath, no dyspnea on exertion, no orthopnea, no fever, no loss of appetite, no lightheadedness/dizziness, no weight loss, no diarrhea, no constipation, no increased leg swelling    Daughter at bedside translated as per patient's preference  General: Awake, Alert, Oriented x1-2  Cardiac: Irregular Rhythm, Bradycardic Rate  Pulmonary: Diminished breath sounds on the right  Abdominal: Soft, ND, NT  Extremities: 3+ edema b/l (which daughter at bedside thinks is it's baseline level of swelling)    # Pneumonia  > PSI/PORT Score of 101 pts (Age and Gender: 89 pts, Pleural Effusion: 10 pts): Risk Class IV, 8.2-9.3% mortality. Hospitalization recommended based on risk.  > Patient was not able to tolerate oral antibiotics due to the pills being too large  > 98% O2 saturation on room air  - IV Ceftriaxone  - IV Azithromycin  - Check Legionella/Strep Urine Antigen  - Check Mycoplasma IgM Antibody  - Check Sputum Culture    # Moderate Right Pleural Effusion  > pro-BNP of 8,984  - Cardiac Telemetry Monitoring  - Monitor Intake/Outputs, Daily Weights  - Fluid and Salt Restriction  - IV Lasix 40mg  - ECHO  - Can follow up repeat chest x-ray in a couple days to reevaluate pleural effusion's response to lasix    # Hx of Dementia on Home Hospice  > Family is interested in placement for the patient  - Consult Palliative Care  - Consult Case Management  - PT Evaluation    # 10th Rib Fracture  - Tylenol prn  - Incentive Spirometer    # Pulmonary Nodule  - Outpatient follow up    # Hx of hard of hearing, asthma, DVT/PE, slow atrial fibrillation on xarelto, chronic bradycardia, HTN, gout, skin cancer, dementia on home hospice, on chronic xanax  - Continue home medications    # DVT PPx: Xarelto    # FEN  - DASH Puree Diet  - Monitor and replete electrolytes as needed  - IV Lasix 40mg  - Fall, Aspiration Precautions    DNR/DNI (MOLST Form in Paper Chart)    Previous Admissions Included  9/18/2024 - 9/19/2024: Bradycardic to 30's. The patient was seen by EP. based on age/comorbidities/overall prognosis - PPM is not recommended. Patient also switched from Warfarin to Xarelto. Family's primary concern is pt's comfort. They do not want any escalation of care or any other invasive interventions. Patient now DNR/DNI. Referred to home hospice. VNS to evaluate tomorrow.  3/22/2021 - 3/28/2021:  PNA  12/13/2017: ER Visit for left eye pain    Patient case and management was discussed with ER Attending  I did examine all labs (including CBC, CMP, pro-BNP), imaging, prior notes  Time-based billing (NON-critical care).  76 minutes spent on total encounter excluding any time spent teaching residents. The necessity of the time spent during the encounter on this date of service was due to: Review of records, vital signs, labs, microbiology, and imaging, Ordering labs and/or tests, General physical examination performed, Generation of treatment plan, Counseling of the patient and/or family members

## 2025-03-10 NOTE — CONSULT NOTE ADULT - PROBLEM SELECTOR RECOMMENDATION 2
patch s/p fall   cont lidocaine patch  recommend morphine 2 mg IV q4h prn moderate pain  recommend morphine 4 mg IV q 4h prn severe pain

## 2025-03-10 NOTE — CONSULT NOTE ADULT - PROBLEM SELECTOR RECOMMENDATION 9
Abx ok for now  management per primary team  No vasopressors Abx ok for now  management per primary team  02 via NC  No Bipap  No vasopressors  consider low-dose opioids for symptom management of dyspnea (morphine 2 mg IV q4h prn dyspnea)

## 2025-03-10 NOTE — CONSULT NOTE ADULT - PROBLEM SELECTOR RECOMMENDATION 6
ANticipate d/c to LTC facility (family will private pay) with hospice abilities ([facility] is first choice)

## 2025-03-10 NOTE — ED PROVIDER NOTE - OBJECTIVE STATEMENT
89M with PMH of DVT and atrial fibrillation on coumadin, HTN, gout, dementia on home hospice presenting status post fall.  Patient is accompanied by daughter Paige at bedside  who is providing collateral history.  Offered , patient prefers daughter to translate.  Patient was in the bathroom today, assisted by his wife.  He was holding onto the railing then fell down on his right side, and is now complaining of right-sided rib pain.  No witnessed head injury or loss of consciousness.  Patient is on Xarelto.  Denies any other bodily injury or pain, no nausea, vomiting, fever or chills.    Of note, patient was seen at the hospital for bradycardia in September, was transferred to [facility] ED for EP evaluation for pacemaker, was found to have HR 30-40s.  Patient was deemed not a candidate for pacemaker given comorbidities and overall prognosis.  Furthermore, daughters do not wish any invasive measures.  Patient is DNR/DNI, reconfirmed status with daughter at bedside.

## 2025-03-11 LAB
ANION GAP SERPL CALC-SCNC: 7 MMOL/L — SIGNIFICANT CHANGE UP (ref 5–17)
BUN SERPL-MCNC: 20 MG/DL — HIGH (ref 7–18)
CALCIUM SERPL-MCNC: 9 MG/DL — SIGNIFICANT CHANGE UP (ref 8.4–10.5)
CHLORIDE SERPL-SCNC: 95 MMOL/L — LOW (ref 96–108)
CO2 SERPL-SCNC: 33 MMOL/L — HIGH (ref 22–31)
CREAT SERPL-MCNC: 1.19 MG/DL — SIGNIFICANT CHANGE UP (ref 0.5–1.3)
EGFR: 58 ML/MIN/1.73M2 — LOW
EGFR: 58 ML/MIN/1.73M2 — LOW
GLUCOSE SERPL-MCNC: 99 MG/DL — SIGNIFICANT CHANGE UP (ref 70–99)
HCT VFR BLD CALC: 42.6 % — SIGNIFICANT CHANGE UP (ref 39–50)
HGB BLD-MCNC: 14.4 G/DL — SIGNIFICANT CHANGE UP (ref 13–17)
M PNEUMO IGM SER-ACNC: 0.77 INDEX — SIGNIFICANT CHANGE UP (ref 0–0.9)
MAGNESIUM SERPL-MCNC: 1.6 MG/DL — SIGNIFICANT CHANGE UP (ref 1.6–2.6)
MCHC RBC-ENTMCNC: 27.7 PG — SIGNIFICANT CHANGE UP (ref 27–34)
MCHC RBC-ENTMCNC: 33.8 G/DL — SIGNIFICANT CHANGE UP (ref 32–36)
MCV RBC AUTO: 81.9 FL — SIGNIFICANT CHANGE UP (ref 80–100)
MYCOPLASMA AG SPEC QL: NEGATIVE — SIGNIFICANT CHANGE UP
NRBC BLD AUTO-RTO: 0 /100 WBCS — SIGNIFICANT CHANGE UP (ref 0–0)
PHOSPHATE SERPL-MCNC: 3.1 MG/DL — SIGNIFICANT CHANGE UP (ref 2.5–4.5)
PLATELET # BLD AUTO: 184 K/UL — SIGNIFICANT CHANGE UP (ref 150–400)
POTASSIUM SERPL-MCNC: 3.4 MMOL/L — LOW (ref 3.5–5.3)
POTASSIUM SERPL-SCNC: 3.4 MMOL/L — LOW (ref 3.5–5.3)
PREALB SERPL-MCNC: 7 MG/DL — LOW (ref 20–40)
RBC # BLD: 5.2 M/UL — SIGNIFICANT CHANGE UP (ref 4.2–5.8)
RBC # FLD: 17.8 % — HIGH (ref 10.3–14.5)
SODIUM SERPL-SCNC: 135 MMOL/L — SIGNIFICANT CHANGE UP (ref 135–145)
WBC # BLD: 6.38 K/UL — SIGNIFICANT CHANGE UP (ref 3.8–10.5)
WBC # FLD AUTO: 6.38 K/UL — SIGNIFICANT CHANGE UP (ref 3.8–10.5)

## 2025-03-11 PROCEDURE — 99231 SBSQ HOSP IP/OBS SF/LOW 25: CPT

## 2025-03-11 PROCEDURE — 99232 SBSQ HOSP IP/OBS MODERATE 35: CPT | Mod: GC

## 2025-03-11 RX ORDER — GLYCOPYRROLATE 0.2 MG/ML
0.4 INJECTION INTRAMUSCULAR; INTRAVENOUS EVERY 6 HOURS
Refills: 0 | Status: DISCONTINUED | OUTPATIENT
Start: 2025-03-11 | End: 2025-03-13

## 2025-03-11 RX ADMIN — IPRATROPIUM BROMIDE AND ALBUTEROL SULFATE 3 MILLILITER(S): .5; 2.5 SOLUTION RESPIRATORY (INHALATION) at 15:31

## 2025-03-11 RX ADMIN — LIDOCAINE HYDROCHLORIDE 1 PATCH: 20 JELLY TOPICAL at 11:53

## 2025-03-11 RX ADMIN — RIVAROXABAN 15 MILLIGRAM(S): 10 TABLET, FILM COATED ORAL at 11:55

## 2025-03-11 RX ADMIN — Medication 0.5 MILLIGRAM(S): at 22:09

## 2025-03-11 RX ADMIN — Medication 580 MILLIGRAM(S): at 01:05

## 2025-03-11 RX ADMIN — Medication 40 MILLIEQUIVALENT(S): at 15:48

## 2025-03-11 RX ADMIN — Medication 0.5 MILLIGRAM(S): at 00:17

## 2025-03-11 RX ADMIN — Medication 580 MILLIGRAM(S): at 00:16

## 2025-03-11 RX ADMIN — Medication 250 MILLIGRAM(S): at 07:15

## 2025-03-11 RX ADMIN — Medication 4 MILLILITER(S): at 20:56

## 2025-03-11 RX ADMIN — FUROSEMIDE 40 MILLIGRAM(S): 10 INJECTION INTRAMUSCULAR; INTRAVENOUS at 06:08

## 2025-03-11 RX ADMIN — PREGABALIN 25 MILLIGRAM(S): 50 CAPSULE ORAL at 11:55

## 2025-03-11 RX ADMIN — Medication 4 MILLILITER(S): at 15:31

## 2025-03-11 RX ADMIN — CEFTRIAXONE 100 MILLIGRAM(S): 500 INJECTION, POWDER, FOR SOLUTION INTRAMUSCULAR; INTRAVENOUS at 07:15

## 2025-03-11 RX ADMIN — IPRATROPIUM BROMIDE AND ALBUTEROL SULFATE 3 MILLILITER(S): .5; 2.5 SOLUTION RESPIRATORY (INHALATION) at 20:56

## 2025-03-11 NOTE — PROGRESS NOTE ADULT - ATTENDING COMMENTS
Patient was seen and examined with wife Carmita and daughter Virginia at bedside. Patient does not verbalize any complains. Daughter reports she is able to feed him    ICU Vital Signs Last 24 Hrs  T(C): 37.2 (11 Mar 2025 13:30), Max: 37.2 (11 Mar 2025 13:30)  T(F): 99 (11 Mar 2025 13:30), Max: 99 (11 Mar 2025 13:30)  HR: 39 (11 Mar 2025 13:30) (39 - 47)  BP: 154/74 (11 Mar 2025 13:30) (122/65 - 163/79)  BP(mean): 81 (11 Mar 2025 11:13) (80 - 107)  ABP: --  ABP(mean): --  RR: 18 (11 Mar 2025 13:30) (17 - 18)  SpO2: 99% (11 Mar 2025 13:30) (95% - 100%)    O2 Parameters below as of 11 Mar 2025 13:30  Patient On (Oxygen Delivery Method): room air    P/E:  NAD, cachectic   AAOx1-2, unable to follow all commands for a complete neuro exam   BL transmitted sound from upper airways   S1S2 WNL  Abd soft, non tender, BS present   BLLE no edema or calf tenderness     labs noted     A/P:  family confirms hospice again but unable to take care of him at home. Will transition him to LTC with hospice. Agrees with continuing abx. Requested no more blood draws. Diet pureed. Aspiration precaution again discussed with family. CM and SW for safe discharge  Discussed the plan with residents Patient was seen and examined with wife Carmita and daughter Virginia at bedside. Patient does not verbalize any complains. Daughter reports she is able to feed him    ICU Vital Signs Last 24 Hrs  T(C): 37.2 (11 Mar 2025 13:30), Max: 37.2 (11 Mar 2025 13:30)  T(F): 99 (11 Mar 2025 13:30), Max: 99 (11 Mar 2025 13:30)  HR: 39 (11 Mar 2025 13:30) (39 - 47)  BP: 154/74 (11 Mar 2025 13:30) (122/65 - 163/79)  BP(mean): 81 (11 Mar 2025 11:13) (80 - 107)  ABP: --  ABP(mean): --  RR: 18 (11 Mar 2025 13:30) (17 - 18)  SpO2: 99% (11 Mar 2025 13:30) (95% - 100%)    O2 Parameters below as of 11 Mar 2025 13:30  Patient On (Oxygen Delivery Method): room air    P/E:  NAD, cachectic   AAOx1-2, unable to follow all commands for a complete neuro exam   BL transmitted sound from upper airways   S1S2 WNL  Abd soft, non tender, BS present   BLLE no edema or calf tenderness     labs noted     A/P:  family confirms hospice again but unable to take care of him at home. Will transition him to LTC with hospice. Agrees with continuing abx. Requested no more blood draws. No further work up or escalation of care. Diet pureed. Aspiration precaution again discussed with family. CM and SW for safe discharge  Discussed the plan with residents

## 2025-03-11 NOTE — PROGRESS NOTE ADULT - PROBLEM SELECTOR PLAN 2
s/p fall   cont lidocaine patch  recommend morphine 2 mg IV q4h prn moderate pain  recommend morphine 4 mg IV q 4h prn severe pain.

## 2025-03-11 NOTE — PROGRESS NOTE ADULT - PROBLEM SELECTOR PLAN 2
Hx afib with slow ventricular response baseline HR 30-40s bpm  HR 33-48 with /52 - 148/89 on admission  EKG shows junctional bradycardia vs A flutter   previously evaluated by EP at [facility] 9/2024 and not candidate for PPM placement given age and comorbidities    avoid AV kenna blocking agents  c/w home xarelto, unsure why on 15mg dose - will suggest increasing v stopping because of fall at home  monitor on telemetry  Cardiology consult in AM Hx afib with slow ventricular response baseline HR 30-40s bpm  HR 33-48 with /52 - 148/89 on admission  EKG shows junctional bradycardia vs A flutter   previously evaluated by EP at [facility] 9/2024 and not candidate for PPM placement given age and comorbidities    avoid AV kenna blocking agents  c/w home xarelto 15mg dose due to GFR  monitor on telemetry  Cardiology consulted

## 2025-03-11 NOTE — PHYSICAL THERAPY INITIAL EVALUATION ADULT - GENERAL OBSERVATIONS, REHAB EVAL
elderly male patient, in care of family bedside, refer for PT, daughter ask therapist take it easy, respected family wishes, patient requires assistance to sit edge of bed, defer oob assessment, left reposition for comfort in bed

## 2025-03-11 NOTE — PROGRESS NOTE ADULT - PROBLEM SELECTOR PLAN 5
h/o HTN on losartan 100mg qd and lasix 20mg PO qd  -hold home losartan in s/o soft BP  -c/w lasix 40mg IV qd with holding parameters for CHF  -monitor BP  -adjust antihypertensive meds as appropriate hx of DVT  unknown timing of dx, but on eliquis 15mg for Afib

## 2025-03-11 NOTE — PROGRESS NOTE ADULT - PROBLEM SELECTOR PLAN 7
currently on home hospice at home   -family states that patient should be DNR/DNI but want medical treatment with goal to return to hospice care likely at facility rather than home  -Palliative care, Dr. Reid, consulted in ED  -Case management consult for hospice placement  -c/w home xanax 0.5mg qhs and pregabalin 25mg qd hx of home hospice  f/u palliative

## 2025-03-11 NOTE — PROGRESS NOTE ADULT - PROBLEM SELECTOR PLAN 5
Pramod Kunz is able to articulate a choice with regards to his care but cannot participate in complicated planning.   Goal is for transition to hospice at a LTC facility  DNR/DNI  No ICU level of care  Does not need telemetry or echo as no intervention (PPM) would be acceptable at this point in his disease trajectory    d/w Dr. Amezquita  HCP: [place]: [phone]  Alternate: [facility]: [phone].

## 2025-03-11 NOTE — PROGRESS NOTE ADULT - PROBLEM SELECTOR PLAN 6
hx of DVT on xarelto  -c/w home med h/o HTN on losartan 100mg qd and lasix 20mg PO qd  hold home losartan in s/o soft BP  c/w lasix 40mg IV qd with holding parameters for CHF  monitor BP  adjust antihypertensive meds as appropriate

## 2025-03-11 NOTE — PROGRESS NOTE ADULT - PROBLEM SELECTOR PLAN 3
p/w lethargy and productive cough x 1 day  -CT chest shows patchy groundglass opacification in the right middle lobe, left lower   lobe, and lingular, likely infectious/inflammatory  -L moderate PLEF simple appearing on POCUS, possible parapneumonic component given unilaterality  -not in respiratory distress and family tentative about possible invasive thoracentesis   -start ceftriaxone 1g IV x 5 days and azithromycin 500mg IV x 3 days  -f/u urine strep/legionella ag, mycoplasma IgM  -airway clearance with duonebs and inhaled hypertonic saline  -mucinex 600mg q12  -incentive spirometry, OOBTC with assistance p/w lethargy and productive cough x 1 day  CT chest= patchy groundglass opacification in the right middle lobe, left lower  lobe, and lingular, likely infectious/inflammatory  L moderate PLEF simple appearing on POCUS, possible parapneumonic component given unilaterality  not in respiratory distress and family tentative about possible invasive thoracentesis     c/w ceftriaxone 1g IV x 5 days and azithromycin 500mg IV x 3 days  f/u urine strep/legionella ag, mycoplasma IgM  airway clearance with duonebs and inhaled hypertonic saline  mucinex 600mg q12, incentive spirometry, OOBTC with assistance

## 2025-03-11 NOTE — PROGRESS NOTE ADULT - PROBLEM SELECTOR PLAN 4
s/p mechanical fall at home   -CT chest shows  Acute to subacute fracture of the left posterolateral 10th rib  -Pain control: tylenol prn, dilaudid PRN for moderate/severe pain  -Lidocaine patch  -incentive spirometry s/p mechanical fall at home   CT chest= Acute to subacute fracture of L posterolateral 10th rib  Pain control: tylenol prn, dilaudid PRN for moderate/severe pain  Lidocaine patch  incentive spirometry

## 2025-03-12 ENCOUNTER — TRANSCRIPTION ENCOUNTER (OUTPATIENT)
Age: 89
End: 2025-03-12

## 2025-03-12 LAB — S PNEUM AG UR QL: NEGATIVE — SIGNIFICANT CHANGE UP

## 2025-03-12 PROCEDURE — 99232 SBSQ HOSP IP/OBS MODERATE 35: CPT | Mod: GC

## 2025-03-12 RX ORDER — ACETAMINOPHEN 500 MG/5ML
18.13 LIQUID (ML) ORAL
Qty: 0 | Refills: 0 | DISCHARGE
Start: 2025-03-12

## 2025-03-12 RX ORDER — GLYCOPYRROLATE 0.2 MG/ML
0.4 INJECTION INTRAMUSCULAR; INTRAVENOUS
Qty: 0 | Refills: 0 | DISCHARGE
Start: 2025-03-12

## 2025-03-12 RX ORDER — LIDOCAINE HYDROCHLORIDE 20 MG/ML
1 JELLY TOPICAL
Qty: 0 | Refills: 0 | DISCHARGE
Start: 2025-03-12

## 2025-03-12 RX ORDER — CEFTRIAXONE 500 MG/1
1 INJECTION, POWDER, FOR SOLUTION INTRAMUSCULAR; INTRAVENOUS
Qty: 0 | Refills: 0 | DISCHARGE
Start: 2025-03-12 | End: 2025-03-14

## 2025-03-12 RX ADMIN — PREGABALIN 25 MILLIGRAM(S): 50 CAPSULE ORAL at 11:14

## 2025-03-12 RX ADMIN — Medication 250 MILLIGRAM(S): at 06:13

## 2025-03-12 RX ADMIN — CEFTRIAXONE 100 MILLIGRAM(S): 500 INJECTION, POWDER, FOR SOLUTION INTRAMUSCULAR; INTRAVENOUS at 06:12

## 2025-03-12 RX ADMIN — LIDOCAINE HYDROCHLORIDE 1 PATCH: 20 JELLY TOPICAL at 11:14

## 2025-03-12 RX ADMIN — LIDOCAINE HYDROCHLORIDE 1 PATCH: 20 JELLY TOPICAL at 23:41

## 2025-03-12 RX ADMIN — Medication 0.5 MILLIGRAM(S): at 21:44

## 2025-03-12 RX ADMIN — IPRATROPIUM BROMIDE AND ALBUTEROL SULFATE 3 MILLILITER(S): .5; 2.5 SOLUTION RESPIRATORY (INHALATION) at 15:11

## 2025-03-12 RX ADMIN — LIDOCAINE HYDROCHLORIDE 1 PATCH: 20 JELLY TOPICAL at 19:41

## 2025-03-12 RX ADMIN — RIVAROXABAN 15 MILLIGRAM(S): 10 TABLET, FILM COATED ORAL at 11:14

## 2025-03-12 RX ADMIN — Medication 4 MILLILITER(S): at 20:26

## 2025-03-12 RX ADMIN — IPRATROPIUM BROMIDE AND ALBUTEROL SULFATE 3 MILLILITER(S): .5; 2.5 SOLUTION RESPIRATORY (INHALATION) at 20:26

## 2025-03-12 RX ADMIN — IPRATROPIUM BROMIDE AND ALBUTEROL SULFATE 3 MILLILITER(S): .5; 2.5 SOLUTION RESPIRATORY (INHALATION) at 09:08

## 2025-03-12 RX ADMIN — Medication 4 MILLILITER(S): at 15:13

## 2025-03-12 RX ADMIN — Medication 2 MILLIGRAM(S): at 11:00

## 2025-03-12 RX ADMIN — Medication 2 MILLIGRAM(S): at 10:47

## 2025-03-12 RX ADMIN — FUROSEMIDE 40 MILLIGRAM(S): 10 INJECTION INTRAMUSCULAR; INTRAVENOUS at 06:13

## 2025-03-12 NOTE — DISCHARGE NOTE PROVIDER - NSDCCPCAREPLAN_GEN_ALL_CORE_FT
PRINCIPAL DISCHARGE DIAGNOSIS  Diagnosis: Acute CHF  Assessment and Plan of Treatment: You have history of CHF (congestive heart failure).  On this admission, your CXR showed some fluid in the lungs.   You were treated with LASIX IV to help with diuresis which has improved your respiratory symptoms and leg swelling.   Please weigh yourself daily and If you gain 3lbs in 3 days, or 5lbs in a week and /or have any swelling or increased swelling in your feet, ankles, and/or stomach call your Health Care Provider.  Do not eat or drink foods containing more than 2000 mg of salt (sodium) in your diet every day and limit fluids to 800cc to 1000 cc per day.  Please take your home lasix as prescribed and follow up with your PCP/Cardiologist in 1 week from discharge to adjust medications as needed.      SECONDARY DISCHARGE DIAGNOSES  Diagnosis: Pneumonia  Assessment and Plan of Treatment: You were diagnosed with pneumonia, which is infection of your lung. We treated you with IV antibiotics and IV fluids which helped your symptoms. You will continue to take CEFTRIAXONE FOR TWO MORE DAYS at [facility]. Please follow up with your PCP within 1 week of discharge.      Diagnosis: Atrial fibrillation with slow ventricular response  Assessment and Plan of Treatment: You have been diagnosed with paroxysmal atrial fibrillation, a type of irregular heartbeat that comes and goes. This condition occurs when the heart's upper chambers (atria) experience sporadic, rapid electrical signals, leading to an irregular and often rapid heart rate. Although these episodes can be intermittent, it’s important to manage them to reduce the risk of complications such as stroke or heart failure.  Upon discharge, it's crucial to follow your prescribed medication regimen meticulously. This includes anticoagulants to prevent blood clots, antiarrhythmics to control your heart rhythm, or rate control medications. Take your medications exactly as directed, without altering doses or schedules, and consult your healthcare provider before making any changes. Regularly monitor your heart rate and blood pressure as instructed, and make sure to attend all follow-up appointments to evaluate your condition and adjust treatment if needed.  In addition to medication, lifestyle changes are important. Follow a heart-healthy diet that includes plenty of fruits, vegetables, and whole grains, while minimizing caffeine and alcohol intake. Engage in regular exercise, tailored to your specific needs by your healthcare provider, and manage stress effectively. Be vigilant for any symptoms like palpitations, dizziness, or shortness of breath, and report these to your provider promptly. Seek immediate medical attention if you experience severe chest pain, fainting, or a sudden worsening of symptoms.      Diagnosis: Suspected fracture of rib of left side  Assessment and Plan of Treatment: You have a fractured rib. We treated you conservatively with pain medications which helped with your breathing. Follow up with your PCP within 1 week of discharge.       PRINCIPAL DISCHARGE DIAGNOSIS  Diagnosis: Acute CHF  Assessment and Plan of Treatment: You have history of CHF (congestive heart failure).  On this admission, your CXR showed some fluid in the lungs.   You were treated with LASIX IV to help with diuresis which has improved your respiratory symptoms and leg swelling.   Please weigh yourself daily and If you gain 3lbs in 3 days, or 5lbs in a week and /or have any swelling or increased swelling in your feet, ankles, and/or stomach call your Health Care Provider.  Do not eat or drink foods containing more than 2000 mg of salt (sodium) in your diet every day and limit fluids to 800cc to 1000 cc per day.  Please take your home lasix as prescribed and follow up with your PCP/Cardiologist in 1 week from discharge to adjust medications as needed.      SECONDARY DISCHARGE DIAGNOSES  Diagnosis: Pneumonia  Assessment and Plan of Treatment: You were diagnosed with pneumonia, which is infection of your lung. We treated you with IV antibiotics and IV fluids which helped your symptoms. You will continue to take CEFUROXIME 500mg TWICE A DAY FOR ONE DAY. Please follow up with your PCP within 1 week of discharge.      Diagnosis: Atrial fibrillation with slow ventricular response  Assessment and Plan of Treatment: You have been diagnosed with paroxysmal atrial fibrillation, a type of irregular heartbeat that comes and goes. This condition occurs when the heart's upper chambers (atria) experience sporadic, rapid electrical signals, leading to an irregular and often rapid heart rate. Although these episodes can be intermittent, it’s important to manage them to reduce the risk of complications such as stroke or heart failure.  Upon discharge, it's crucial to follow your prescribed medication regimen meticulously. This includes anticoagulants to prevent blood clots, antiarrhythmics to control your heart rhythm, or rate control medications. Take your medications exactly as directed, without altering doses or schedules, and consult your healthcare provider before making any changes. Regularly monitor your heart rate and blood pressure as instructed, and make sure to attend all follow-up appointments to evaluate your condition and adjust treatment if needed.  In addition to medication, lifestyle changes are important. Follow a heart-healthy diet that includes plenty of fruits, vegetables, and whole grains, while minimizing caffeine and alcohol intake. Engage in regular exercise, tailored to your specific needs by your healthcare provider, and manage stress effectively. Be vigilant for any symptoms like palpitations, dizziness, or shortness of breath, and report these to your provider promptly. Seek immediate medical attention if you experience severe chest pain, fainting, or a sudden worsening of symptoms.      Diagnosis: DVT, lower extremity  Assessment and Plan of Treatment: You have a history of DVT. Follow up with your PCP within 1 week of discharge.    Diagnosis: HTN (hypertension)  Assessment and Plan of Treatment: You have a history of Hypertension.   On this admission, your Blood Pressure was adequately controlled with home medications.  Your blood pressure target is 120-140/80-90, maintain healthy lifestyle, low salt diet, avoid fatty food, weight loss, exercise regularly or stay active as tolerated 30 mins X 3 times per week.  Notify your doctor if you have any of the following symptoms:   (Dizziness, Lightheadedness, Blurry vision, Headache, Chest pain, Shortness of breath.)  Please continue taking your home medications and follow-up with your PCP in 1 week from discharge to adjust medications as needed.    Diagnosis: Suspected fracture of rib of left side  Assessment and Plan of Treatment: You have a fractured rib. We treated you conservatively with pain medications which helped with your breathing. Follow up with your PCP within 1 week of discharge.       PRINCIPAL DISCHARGE DIAGNOSIS  Diagnosis: Acute CHF  Assessment and Plan of Treatment: You have history of CHF (congestive heart failure).  On this admission, your CXR showed some fluid in the lungs.   You were treated with LASIX IV to help with diuresis which has improved your respiratory symptoms and leg swelling.   Please weigh yourself daily and If you gain 3lbs in 3 days, or 5lbs in a week and /or have any swelling or increased swelling in your feet, ankles, and/or stomach call your Health Care Provider.  Do not eat or drink foods containing more than 2000 mg of salt (sodium) in your diet every day and limit fluids to 800cc to 1000 cc per day.  Please take your home lasix as prescribed and follow up with your PCP/Cardiologist in 1 week from discharge to adjust medications as needed.      SECONDARY DISCHARGE DIAGNOSES  Diagnosis: Suspected fracture of rib of left side  Assessment and Plan of Treatment: You have a fractured rib. We treated you conservatively with pain medications which helped with your breathing. Follow up with your PCP within 1 week of discharge.      Diagnosis: Atrial fibrillation with slow ventricular response  Assessment and Plan of Treatment: You have been diagnosed with paroxysmal atrial fibrillation, a type of irregular heartbeat that comes and goes. This condition occurs when the heart's upper chambers (atria) experience sporadic, rapid electrical signals, leading to an irregular and often rapid heart rate. Although these episodes can be intermittent, it’s important to manage them to reduce the risk of complications such as stroke or heart failure.  Upon discharge, it's crucial to follow your prescribed medication regimen meticulously. This includes anticoagulants to prevent blood clots, antiarrhythmics to control your heart rhythm, or rate control medications. Take your medications exactly as directed, without altering doses or schedules, and consult your healthcare provider before making any changes. Regularly monitor your heart rate and blood pressure as instructed, and make sure to attend all follow-up appointments to evaluate your condition and adjust treatment if needed.  In addition to medication, lifestyle changes are important. Follow a heart-healthy diet that includes plenty of fruits, vegetables, and whole grains, while minimizing caffeine and alcohol intake. Engage in regular exercise, tailored to your specific needs by your healthcare provider, and manage stress effectively. Be vigilant for any symptoms like palpitations, dizziness, or shortness of breath, and report these to your provider promptly. Seek immediate medical attention if you experience severe chest pain, fainting, or a sudden worsening of symptoms.      Diagnosis: Pneumonia  Assessment and Plan of Treatment: You were diagnosed with pneumonia, which is infection of your lung. We treated you with IV antibiotics and IV fluids which helped your symptoms. You will continue to take CEFUROXIME 500mg TWICE A DAY FOR ONE DAY. Please follow up with your PCP within 1 week of discharge.      Diagnosis: DVT, lower extremity  Assessment and Plan of Treatment: You have a history of DVT. Continue Xarelto. Follow up with your PCP within 1 week of discharge.    Diagnosis: HTN (hypertension)  Assessment and Plan of Treatment: You have a history of Hypertension.   On this admission, your Blood Pressure was adequately controlled with home medications.  Your blood pressure target is 120-140/80-90, maintain healthy lifestyle, low salt diet, avoid fatty food, weight loss, exercise regularly or stay active as tolerated 30 mins X 3 times per week.  Notify your doctor if you have any of the following symptoms:   (Dizziness, Lightheadedness, Blurry vision, Headache, Chest pain, Shortness of breath.)  Please continue taking your home medications and follow-up with your PCP in 1 week from discharge to adjust medications as needed.

## 2025-03-12 NOTE — PROGRESS NOTE ADULT - PROBLEM SELECTOR PLAN 2
Hx afib with slow ventricular response baseline HR 30-40s bpm  HR 33-48 with /52 - 148/89 on admission  EKG shows junctional bradycardia vs A flutter   previously evaluated by EP at [facility] 9/2024 and not candidate for PPM placement given age and comorbidities    avoid AV kenna blocking agents  c/w home xarelto 15mg dose due to GFR  monitor on telemetry  Cardiology consulted

## 2025-03-12 NOTE — SWALLOW BEDSIDE ASSESSMENT ADULT - MODE OF PRESENTATION
x3/spoon/fed by clinician 4oz/cup/fed by clinician x2/spoon/fed by clinician x1 ice chip/fed by clinician

## 2025-03-12 NOTE — PROGRESS NOTE ADULT - PROBLEM SELECTOR PLAN 6
h/o HTN on losartan 100mg qd and lasix 20mg PO qd  hold home losartan in s/o soft BP  c/w lasix 40mg IV qd with holding parameters for CHF  monitor BP  adjust antihypertensive meds as appropriate

## 2025-03-12 NOTE — DISCHARGE NOTE PROVIDER - ATTENDING DISCHARGE PHYSICAL EXAMINATION:
Patient was seen and examined at bedside. Denies any new complains     ICU Vital Signs Last 24 Hrs  T(C): 36.3 (13 Mar 2025 13:37), Max: 36.5 (13 Mar 2025 05:34)  T(F): 97.3 (13 Mar 2025 13:37), Max: 97.7 (13 Mar 2025 05:34)  HR: 40 (13 Mar 2025 13:37) (40 - 68)  BP: 122/54 (13 Mar 2025 13:37) (122/54 - 143/72)  BP(mean): 74 (13 Mar 2025 05:34) (74 - 74)  ABP: --  ABP(mean): --  RR: 18 (13 Mar 2025 13:37) (17 - 18)  SpO2: 96% (13 Mar 2025 13:37) (96% - 99%)    O2 Parameters below as of 13 Mar 2025 13:37  Patient On (Oxygen Delivery Method): room air    P/E:  NAD, cachectic   AAOx1-2, unable to follow all commands for a complete neuro exam   BL transmitted sound from upper airways   S1S2 WNL  Abd soft, non tender, BS present   BLLE no edema or calf tenderness     A/P:  Met daughter Paige at bedside. Discussed about current treatment for pneumonia and discussed aspiration precautions. Also discussed the current co morbidities and prognosis. Daughter reiterates that family patient to be as comfortable as possible and understands escalation of care won't add to his life expectancy. They are not able to take care of him at home due to increasing needs of ADLs and wants to take him to [facility] and transition him to LTC with hospice pending insurance and finance arrangements. Daughter was very appreciative of the care provided by medical team.

## 2025-03-12 NOTE — PROGRESS NOTE ADULT - ATTENDING COMMENTS
Patient was seen and examined at bedside. Laying comfortably on bed.     ICU Vital Signs Last 24 Hrs  T(C): 37.1 (12 Mar 2025 13:09), Max: 37.1 (12 Mar 2025 13:09)  T(F): 98.8 (12 Mar 2025 13:09), Max: 98.8 (12 Mar 2025 13:09)  HR: 42 (12 Mar 2025 13:09) (42 - 42)  BP: 127/65 (12 Mar 2025 13:09) (127/65 - 153/68)  BP(mean): 88 (12 Mar 2025 05:28) (88 - 88)  ABP: --  ABP(mean): --  RR: 17 (12 Mar 2025 13:09) (17 - 17)  SpO2: 96% (12 Mar 2025 13:09) (96% - 100%)    O2 Parameters below as of 12 Mar 2025 13:09  Patient On (Oxygen Delivery Method): room air      P/E:  NAD, cachectic   AAOx1-2, unable to follow all commands for a complete neuro exam   BL transmitted sound from upper airways   S1S2 WNL  Abd soft, non tender, BS present   BLLE no edema or calf tenderness     labs noted     A/P:  Pending transition to LTC with or without hospice. Continue abx.   Diet pureed. Aspiration precaution   Discussed with CM and Palliative care attending regarding disposition plan. Plan to be discharged to [place], depending on insurance approval.

## 2025-03-12 NOTE — DIETITIAN INITIAL EVALUATION ADULT - PERTINENT MEDS FT
MEDICATIONS  (STANDING):  albuterol    90 MICROgram(s) HFA Inhaler 2 Puff(s) Inhalation every 6 hours  albuterol/ipratropium for Nebulization 3 milliLiter(s) Nebulizer every 6 hours  ALPRAZolam 0.5 milliGRAM(s) Oral at bedtime  cefTRIAXone   IVPB 1000 milliGRAM(s) IV Intermittent every 24 hours  furosemide   Injectable 40 milliGRAM(s) IV Push daily  lidocaine   4% Patch 1 Patch Transdermal daily  pregabalin 25 milliGRAM(s) Oral daily  rivaroxaban 15 milliGRAM(s) Oral daily  sodium chloride 3%  Inhalation 4 milliLiter(s) Inhalation every 6 hours    MEDICATIONS  (PRN):  acetaminophen   Oral Liquid .. 580 milliGRAM(s) Oral every 4 hours PRN Temp greater or equal to 38C (100.4F), Mild Pain (1 - 3)  glycopyrrolate Injectable 0.4 milliGRAM(s) IV Push every 6 hours PRN Excessive Secretions  morphine  - Injectable 2 milliGRAM(s) IV Push every 4 hours PRN Air hunger

## 2025-03-12 NOTE — DIETITIAN INITIAL EVALUATION ADULT - ADD RECOMMEND
Severe malnutrition; Recommend to liberalize current diet order to regular diet with oral supplement Ensure Plus HP BID (350 kcal, 20 g pro each) as medically feasible.  Severe malnutrition; Recommend to liberalize current diet order to regular diet with oral supplement Two Mark HN BID (475 kcal, 20 g pro each) as medically feasible.

## 2025-03-12 NOTE — DIETITIAN NUTRITION RISK NOTIFICATION - ADDITIONAL COMMENTS/DIETITIAN RECOMMENDATIONS
Recommend to liberalize current diet order to regular diet with oral supplement Two Mark HN BID (475 kcal, 20 g pro each) as medically feasible.

## 2025-03-12 NOTE — DIETITIAN INITIAL EVALUATION ADULT - PERTINENT LABORATORY DATA
03-11    135  |  95[L]  |  20[H]  ----------------------------<  99  3.4[L]   |  33[H]  |  1.19    Ca    9.0      11 Mar 2025 12:09  Phos  3.1     03-11  Mg     1.6     03-11

## 2025-03-12 NOTE — SWALLOW BEDSIDE ASSESSMENT ADULT - ASR SWALLOW DENTITION
Pt attested to using dentures at home, not present at bedside; one right incisor, front molar, and bottom molar present./incomplete

## 2025-03-12 NOTE — SWALLOW BEDSIDE ASSESSMENT ADULT - SWALLOW EVAL: ANTICIPATED DISCHARGE DISPOSITION
Family expressed need for rehabilitation faciilty; Pt currently from home with [facility] 10AM-1PM/home w/ home health

## 2025-03-12 NOTE — DISCHARGE NOTE PROVIDER - NSDCMRMEDTOKEN_GEN_ALL_CORE_FT
acetaminophen 160 mg/5 mL oral suspension: 18.13 milliliter(s) orally every 4 hours as needed for Temp greater or equal to 38C (100.4F), Mild Pain (1 - 3)  ALPRAZolam 0.5 mg oral tablet: 1 tab(s) orally once a day (at bedtime)  cefTRIAXone: 1 gram(s) intravenous once a day  furosemide 20 mg oral tablet: 1 tab(s) orally once a day  glycopyrrolate 0.2 mg/mL injectable solution: 0.4 milligram(s) injectable every 6 hours as needed for  congestion  lidocaine 4% topical film: Apply topically to affected area every 24 hours as needed for  chest pain  losartan 100 mg oral tablet: 1 tab(s) orally once a day  morphine: 2 milligram(s) intravenous every 4 hours as needed for air hunger  pregabalin 25 mg oral capsule: 1 cap(s) orally once a day  rivaroxaban 15 mg oral tablet: 1 tab(s) orally once a day (before a meal)   acetaminophen 160 mg/5 mL oral suspension: 18.13 milliliter(s) orally every 4 hours as needed for Temp greater or equal to 38C (100.4F), Mild Pain (1 - 3)  ALPRAZolam 0.5 mg oral tablet: 1 tab(s) orally once a day (at bedtime)  cefuroxime 500 mg oral tablet: 1 tab(s) orally 2 times a day  furosemide 20 mg oral tablet: 1 tab(s) orally once a day  glycopyrrolate 0.2 mg/mL injectable solution: 0.4 milligram(s) injectable 4 times a day as needed for  shortness of breath and/or wheezing FOR EXCESSIVE SECRETIONS  lidocaine 4% topical film: Apply topically to affected area every 24 hours as needed for  chest pain  losartan 100 mg oral tablet: 1 tab(s) orally once a day  morphine 20 mg/mL oral concentrate: 0.25 milliliter(s) sublingual every 6 hours as needed for pain or shortness of breath MDD: 1 mL  pregabalin 25 mg oral capsule: 1 cap(s) orally once a day  rivaroxaban 15 mg oral tablet: 1 tab(s) orally once a day (before a meal)

## 2025-03-12 NOTE — DISCHARGE NOTE PROVIDER - HOSPITAL COURSE
89y M, from home with hospice, ambulates with cane/walker, with PMH of Asthma, Skin cancer, HTN, Gout,  DVT on xarelto, Afib/flutter with slow ventricular response presenting s/p mechanical fall in s/o one day of lethargy and productive cough. HR 33-48 with /52 - 148/89 on admission. Last TTE (2021): EF 55%, trace MR. EKG shows junctional bradycardia vs A flutter. Previously evaluated by EP at [facility] 9/2024 and not candidate for PPM placement given age and comorbidities. CTH negative. CT chest= Acute to subacute fracture of L posterolateral 10th ribm, cardiomegaly & b/l pleural effusions (L moderate, R small), patchy groundglass opacification in the right middle lobe, left lower  lobe, and lingular, likely infectious/inflammatory. p-BNP 8984 and 3+ b/l pitting edema on exam. Admitted to telemetry for acute CHF and PNA.       Pt was started on lasix 40mgIV qd and cardio was consulted. Continued on ceftriaxone and azithromycin. U rine strep/legionella ag, mycoplasma IgM NEG    Patient is stable for discharge per attending and is advised to follow up with PCP as outpatient. 89y M, from home with hospice, ambulates with cane/walker, with PMH of Asthma, Skin cancer, HTN, Gout,  DVT on xarelto, Afib/flutter with slow ventricular response presenting s/p mechanical fall in s/o one day of lethargy and productive cough. HR 33-48 with /52 - 148/89 on admission. Last TTE (2021): EF 55%, trace MR. EKG shows junctional bradycardia vs A flutter. Previously evaluated by EP at [facility] 9/2024 and not candidate for PPM placement given age and comorbidities. CTH negative. CT chest= Acute to subacute fracture of L posterolateral 10th ribm, cardiomegaly & b/l pleural effusions (L moderate, R small), patchy groundglass opacification in the right middle lobe, left lower  lobe, and lingular, likely infectious/inflammatory. p-BNP 8984 and 3+ b/l pitting edema on exam. Admitted to telemetry for acute CHF and PNA.       Pt was started on lasix 40mgIV qd and cardio was consulted. Continued on ceftriaxone and azithromycin. Urine strep/legionella ag, mycoplasma IgM NEG  Palliative care followed. Family opted for patient to go to [facility]. Pt to continue with palliative care at [facility] and possible transition to home hospice in the future.     Patient is stable for discharge per attending and is advised to follow up with PCP as outpatient. 89y M, from home with hospice, ambulates with cane/walker, with PMH of Asthma, Skin cancer, HTN, Gout,  DVT on Xarelto, Afib/flutter with slow ventricular response presenting s/p mechanical fall in s/o one day of lethargy and productive cough. HR 33-48 with /52 - 148/89 on admission. Last TTE (2021): EF 55%, trace MR. EKG shows junctional bradycardia vs A flutter. Previously evaluated by EP at [facility] 9/2024 and not candidate for PPM placement given age and comorbidities. CTH negative. CT chest= Acute to subacute fracture of L posterolateral 10th ribm, cardiomegaly & b/l pleural effusions (L moderate, R small), patchy groundglass opacification in the right middle lobe, left lower  lobe, and lingular, likely infectious/inflammatory. p-BNP 8984 and 3+ b/l pitting edema on exam. Admitted to telemetry for acute CHF and PNA.       Pt was started on lasix 40mgIV qd and cardio was consulted. Continued on ceftriaxone and azithromycin. Urine strep/legionella ag, mycoplasma IgM NEG  Palliative care followed. Family opted for patient to go to [facility]. Pt to continue with palliative care at [facility] and possible transition to home hospice in the future.     Patient is stable for discharge per attending and is advised to follow up with PCP as outpatient.

## 2025-03-12 NOTE — SWALLOW BEDSIDE ASSESSMENT ADULT - COMMENTS
AA+Ox2-3 (name, [facility], March) + cooperative for purpose of examination, HOB elevated to 90°. + Cachectic. Pt received awake. Verbal. Stateless speaking; VI [id number] used for duration of examination. Able to take directives + express wants/needs. Denies any current pain, discomfort, or globus sensation. Notably, Pt expressed that he wanted IV removed. Upon examination of oral cavity, thick white coating on lingual muscle; cannot r/o oral thrush.

## 2025-03-12 NOTE — SWALLOW BEDSIDE ASSESSMENT ADULT - PHARYNGEAL PHASE
Decreased laryngeal elevation/Multiple swallows Decreased laryngeal elevation/Wet vocal quality post oral intake/Cough post oral intake/Throat clear post oral intake/Multiple swallows

## 2025-03-12 NOTE — DIETITIAN NUTRITION RISK NOTIFICATION - TREATMENT: THE FOLLOWING DIET HAS BEEN RECOMMENDED
Diet, Pureed:   DASH/TLC {Sodium & Cholesterol Restricted}  Mildly Thick Liquids (MILDTHICKLIQS) (03-12-25 @ 11:15) [Active]

## 2025-03-12 NOTE — SWALLOW BEDSIDE ASSESSMENT ADULT - SWALLOW EVAL: DIAGNOSIS
Pt p/w s&s oropharyngeal dysphagia w/ age related changes (presbyphagia) exacerbated by debility, c/b mild xerostomia, reduced oral receptiveness, mild anterior loss of bolus 2/2 to impaired labial seal, severely prolonged mastication, reduced a+p transport, base of tongue pumping felt upon palpitation, bolus holding, delayed + hard swallow reflex, mildly reduced hyoid excursion, and weak hyolaryngeal elevation. Overt s&s of penetration/aspiration appreciated on trials of ice chips c/b immediate post-swallow coughing, throat clearing, and gurgly/wet vocal quality.

## 2025-03-12 NOTE — PROGRESS NOTE ADULT - PROBLEM SELECTOR PLAN 4
s/p mechanical fall at home   CT chest= Acute to subacute fracture of L posterolateral 10th rib  Pain control: tylenol prn, dilaudid PRN for moderate/severe pain  Lidocaine patch  incentive spirometry

## 2025-03-12 NOTE — SWALLOW BEDSIDE ASSESSMENT ADULT - SLP PERTINENT HISTORY OF CURRENT PROBLEM
89y M, from home with hospice, ambulates with cane/walker, with PMH of Asthma, Skin cancer, HTN, Gout,  DVT on xarelto, Afib/flutter with slow ventricular response presenting s/p mechanical fall in s/o one day of lethargy and productive cough. Reduced PO intake w/ reduced oral receptiveness as per past documentation.

## 2025-03-12 NOTE — PROGRESS NOTE ADULT - PROBLEM SELECTOR PLAN 3
p/w lethargy and productive cough x 1 day  CT chest= patchy groundglass opacification in the right middle lobe, left lower  lobe, and lingular, likely infectious/inflammatory  L moderate PLEF simple appearing on POCUS, possible parapneumonic component given unilaterality  not in respiratory distress and family tentative about possible invasive thoracentesis     c/w ceftriaxone 1g IV x 5 days and azithromycin 500mg IV x 3 days  urine strep/legionella ag, mycoplasma IgM NEG  airway clearance with duonebs and inhaled hypertonic saline  mucinex 600mg q12, incentive spirometry, OOBTC with assistance

## 2025-03-12 NOTE — DIETITIAN INITIAL EVALUATION ADULT - PROBLEM SELECTOR PLAN 2
hx of afib with slow ventricular response baseline HR 30-40s bpm  -HR 33-48 with /52 - 148/89 on admission  -EKG shows junctional bradycardia vs A flutter   -previously evaluated by EP at [facility] 9/2024 and not candidate for PPM placement given age and comorbidities  -avoid AV kenna blocking agents  -c/w home xarelto  -monitor on telemetry  -Cardiology consult in AM

## 2025-03-12 NOTE — PHARMACOTHERAPY INTERVENTION NOTE - COMMENTS
Patient identified by [facility] for Patients 64 y/o and Older Report.     Morphine PRN for dyspnea.    No intervention at this time. Patient followed by Palliative team.
Patient identified by [facility] for Pneumonia.     Medication list was reviewed and no additional recommendations at this time.
Patient identified by [facility] Patients 66 y/o and Older Report.     Alprazolam 0.5mg at bedtime - home medication and dose    No intervention at this time.

## 2025-03-12 NOTE — DIETITIAN INITIAL EVALUATION ADULT - NS FNS DIET ORDER
Diet, Pureed:   DASH/TLC {Sodium & Cholesterol Restricted}  Mildly Thick Liquids (MILDTHICKLIQS) (03-12-25 @ 11:15)

## 2025-03-12 NOTE — DIETITIAN INITIAL EVALUATION ADULT - ORAL INTAKE PTA/DIET HISTORY
Pt is from home, A&Ox1-2, Upper sorbian speaking however pt's wife at bedside and able to participate in nutritional interview. H/o Afib, DVT, gout, HTN, skin cancer, asthma; admitted for acute CHF and PNA. Pt is generally with good appetite PTA and in-house consuming ~50% of meals. NFPE completed with results below. Reviewed oral supplementation options and pt agrees to ONS; pt consumes 1 bottle of ONS daily at home. No chewing/ swallowing issues reported. Denies any GI distress. No food preferences at this time. No known food allergies reported. Per chart review pt is with home hospice.

## 2025-03-13 ENCOUNTER — TRANSCRIPTION ENCOUNTER (OUTPATIENT)
Age: 89
End: 2025-03-13

## 2025-03-13 VITALS
TEMPERATURE: 97 F | DIASTOLIC BLOOD PRESSURE: 54 MMHG | HEART RATE: 40 BPM | SYSTOLIC BLOOD PRESSURE: 122 MMHG | RESPIRATION RATE: 18 BRPM | OXYGEN SATURATION: 96 %

## 2025-03-13 PROCEDURE — 71250 CT THORAX DX C-: CPT | Mod: MC

## 2025-03-13 PROCEDURE — 71045 X-RAY EXAM CHEST 1 VIEW: CPT

## 2025-03-13 PROCEDURE — 86850 RBC ANTIBODY SCREEN: CPT

## 2025-03-13 PROCEDURE — 84134 ASSAY OF PREALBUMIN: CPT

## 2025-03-13 PROCEDURE — 96374 THER/PROPH/DIAG INJ IV PUSH: CPT

## 2025-03-13 PROCEDURE — 86738 MYCOPLASMA ANTIBODY: CPT

## 2025-03-13 PROCEDURE — 94640 AIRWAY INHALATION TREATMENT: CPT

## 2025-03-13 PROCEDURE — 84100 ASSAY OF PHOSPHORUS: CPT

## 2025-03-13 PROCEDURE — 72125 CT NECK SPINE W/O DYE: CPT | Mod: MC

## 2025-03-13 PROCEDURE — 87637 SARSCOV2&INF A&B&RSV AMP PRB: CPT

## 2025-03-13 PROCEDURE — 82962 GLUCOSE BLOOD TEST: CPT

## 2025-03-13 PROCEDURE — 83880 ASSAY OF NATRIURETIC PEPTIDE: CPT

## 2025-03-13 PROCEDURE — 87449 NOS EACH ORGANISM AG IA: CPT

## 2025-03-13 PROCEDURE — 85027 COMPLETE CBC AUTOMATED: CPT

## 2025-03-13 PROCEDURE — 97161 PT EVAL LOW COMPLEX 20 MIN: CPT

## 2025-03-13 PROCEDURE — 99285 EMERGENCY DEPT VISIT HI MDM: CPT | Mod: 25

## 2025-03-13 PROCEDURE — 83735 ASSAY OF MAGNESIUM: CPT

## 2025-03-13 PROCEDURE — 92610 EVALUATE SWALLOWING FUNCTION: CPT

## 2025-03-13 PROCEDURE — 80048 BASIC METABOLIC PNL TOTAL CA: CPT

## 2025-03-13 PROCEDURE — 85025 COMPLETE CBC W/AUTO DIFF WBC: CPT

## 2025-03-13 PROCEDURE — 86901 BLOOD TYPING SEROLOGIC RH(D): CPT

## 2025-03-13 PROCEDURE — 99239 HOSP IP/OBS DSCHRG MGMT >30: CPT

## 2025-03-13 PROCEDURE — 86900 BLOOD TYPING SEROLOGIC ABO: CPT

## 2025-03-13 PROCEDURE — 36415 COLL VENOUS BLD VENIPUNCTURE: CPT

## 2025-03-13 PROCEDURE — 70450 CT HEAD/BRAIN W/O DYE: CPT | Mod: MC

## 2025-03-13 PROCEDURE — 80053 COMPREHEN METABOLIC PANEL: CPT

## 2025-03-13 PROCEDURE — 93005 ELECTROCARDIOGRAM TRACING: CPT

## 2025-03-13 PROCEDURE — 87899 AGENT NOS ASSAY W/OPTIC: CPT

## 2025-03-13 RX ORDER — GLYCOPYRROLATE 0.2 MG/ML
0.4 INJECTION INTRAMUSCULAR; INTRAVENOUS
Qty: 0 | Refills: 0 | DISCHARGE
Start: 2025-03-13

## 2025-03-13 RX ORDER — CEFUROXIME SODIUM 1.5 G
1 VIAL (EA) INJECTION
Qty: 2 | Refills: 0
Start: 2025-03-13 | End: 2025-03-13

## 2025-03-13 RX ADMIN — LIDOCAINE HYDROCHLORIDE 1 PATCH: 20 JELLY TOPICAL at 11:38

## 2025-03-13 RX ADMIN — IPRATROPIUM BROMIDE AND ALBUTEROL SULFATE 3 MILLILITER(S): .5; 2.5 SOLUTION RESPIRATORY (INHALATION) at 15:41

## 2025-03-13 RX ADMIN — FUROSEMIDE 40 MILLIGRAM(S): 10 INJECTION INTRAMUSCULAR; INTRAVENOUS at 05:37

## 2025-03-13 RX ADMIN — Medication 4 MILLILITER(S): at 10:02

## 2025-03-13 RX ADMIN — RIVAROXABAN 15 MILLIGRAM(S): 10 TABLET, FILM COATED ORAL at 11:39

## 2025-03-13 RX ADMIN — PREGABALIN 25 MILLIGRAM(S): 50 CAPSULE ORAL at 11:39

## 2025-03-13 RX ADMIN — Medication 4 MILLILITER(S): at 15:55

## 2025-03-13 RX ADMIN — IPRATROPIUM BROMIDE AND ALBUTEROL SULFATE 3 MILLILITER(S): .5; 2.5 SOLUTION RESPIRATORY (INHALATION) at 09:46

## 2025-03-13 RX ADMIN — CEFTRIAXONE 100 MILLIGRAM(S): 500 INJECTION, POWDER, FOR SOLUTION INTRAMUSCULAR; INTRAVENOUS at 05:37

## 2025-03-13 NOTE — PROGRESS NOTE ADULT - PROBLEM SELECTOR PLAN 9
DVT: Xarelto  c/w home xarelto 15mg dose due to CrCl <50 (32)
DVT: Xarelto  c/w home xarelto 15mg dose due to CrCl <50 (32)
DVT: Xarelto  c/w home xarelto 15mg dose due to GFR

## 2025-03-13 NOTE — PROGRESS NOTE ADULT - PROBLEM SELECTOR PLAN 1
Abx ok for now  management per primary team  Stable on RA today  No Bipap  No vasopressors  consider low-dose opioids for symptom management of dyspnea (morphine 2 mg IV q4h prn dyspnea).
Hx of Afib with slow ventricular response baseline HR 30-40s bpm  CT chest= cardiomegaly & b/l pleural effusions (L moderate, R small)  p-BNP 8984 and 3+ b/l pitting edema on exam  Last TTE (2021): EF 55%, trace MR    no need for repeat TTE  takes lasix 20mg PO qd at home   started lasix 40mg IV qd  strict I/Os, daily weight  Admit to telemetry  Cardiology consulted

## 2025-03-13 NOTE — PROGRESS NOTE ADULT - PROVIDER SPECIALTY LIST ADULT
Cardiology
Internal Medicine
Cardiology
Cardiology
Palliative Care
Internal Medicine
Internal Medicine

## 2025-03-13 NOTE — DISCHARGE NOTE NURSING/CASE MANAGEMENT/SOCIAL WORK - FINANCIAL ASSISTANCE
[facility] provides services at a reduced cost to those who are determined to be eligible through [facility]’s financial assistance program. Information regarding [facility]’s financial assistance program can be found by going to [email or web address] or by calling [phone].

## 2025-03-13 NOTE — PROGRESS NOTE ADULT - PROBLEM SELECTOR PROBLEM 2
Atrial fibrillation with slow ventricular response
Atrial fibrillation with slow ventricular response
Fracture of one rib of left side
Atrial fibrillation with slow ventricular response

## 2025-03-13 NOTE — PROGRESS NOTE ADULT - REASON FOR ADMISSION
PNA, rib fracture s/p mechanical fall

## 2025-03-13 NOTE — PROGRESS NOTE ADULT - NUTRITIONAL ASSESSMENT
This patient has been assessed with a concern for Malnutrition and has been determined to have a diagnosis/diagnoses of Moderate protein-calorie malnutrition.    This patient is being managed with:   Diet Pureed-  DASH/TLC {Sodium & Cholesterol Restricted}  Mildly Thick Liquids (MILDTHICKLIQS)  Entered: Mar 12 2025 11:15AM    The following pending diet order is being considered for treatment of Moderate protein-calorie malnutrition:  Diet Pureed-  Mildly Thick Liquids (MILDTHICKLIQS)  Supplement Feeding Modality:  Oral  Two Mark HN Cans or Servings Per Day:  1       Frequency:  Two Times a day  Entered: Mar 12 2025  2:40PM

## 2025-03-13 NOTE — PROGRESS NOTE ADULT - ASSESSMENT
89y M, from home with hospice, ambulates with cane/walker, with PMH of Asthma, Skin cancer, HTN, Gout,  DVT on xarelto, Afib/flutter with slow ventricular response presenting s/p mechanical fall in s/o one day of lethargy and productive cough. Admitted to telemetry for acute new onset CHF, PNA, and placement.   
89y M, from home with hospice ([facility]) , ambulates with cane/walker, with PMH of Asthma, Skin cancer, HTN, Gout,  DVT on xarelto, Afib/flutter with slow ventricular response presenting s/p mechanical fall in s/o one day of lethargy and productive cough. He was prescribed antibiotics but the pills were too big for him to take. Admitted for PNA and CHF exacerbation.   
89y M, from home with hospice, ambulates with cane/walker, with PMH of Asthma, Skin cancer, HTN, Gout,  DVT on xarelto, Afib/flutter with slow ventricular response presenting s/p mechanical fall in s/o one day of lethargy and productive cough. Admitted to telemetry for acute new onset CHF, PNA, and placement.   
89y M, from home with hospice, ambulates with cane/walker, with PMH of Asthma, Skin cancer, HTN, Gout,  DVT on xarelto, Afib/flutter with slow ventricular response presenting s/p mechanical fall in s/o one day of lethargy and productive cough. Admitted to telemetry for acute new onset CHF, PNA, and placement.

## 2025-03-13 NOTE — PROGRESS NOTE ADULT - PROBLEM SELECTOR PLAN 2
Hx afib with slow ventricular response baseline HR 30-40s bpm  HR 33-48 with /52 - 148/89 on admission  EKG shows junctional bradycardia vs A flutter   previously evaluated by EP at [facility] 9/2024 and not candidate for PPM placement given age and comorbidities    avoid AV kenna blocking agents  c/w home xarelto 15mg dose due to GFR  monitor on telemetry  Cardiology consulted

## 2025-03-13 NOTE — PROGRESS NOTE ADULT - SUBJECTIVE AND OBJECTIVE BOX
C A R D I O L O G Y  **********************************     DATE OF SERVICE: 03-12-25    Patient denies chest pain or shortness of breath.   Review of symptoms otherwise negative.    acetaminophen   Oral Liquid .. 580 milliGRAM(s) Oral every 4 hours PRN  albuterol    90 MICROgram(s) HFA Inhaler 2 Puff(s) Inhalation every 6 hours  albuterol/ipratropium for Nebulization 3 milliLiter(s) Nebulizer every 6 hours  ALPRAZolam 0.5 milliGRAM(s) Oral at bedtime  cefTRIAXone   IVPB 1000 milliGRAM(s) IV Intermittent every 24 hours  furosemide   Injectable 40 milliGRAM(s) IV Push daily  glycopyrrolate Injectable 0.4 milliGRAM(s) IV Push every 6 hours PRN  lidocaine   4% Patch 1 Patch Transdermal daily  morphine  - Injectable 2 milliGRAM(s) IV Push every 4 hours PRN  pregabalin 25 milliGRAM(s) Oral daily  rivaroxaban 15 milliGRAM(s) Oral daily  sodium chloride 3%  Inhalation 4 milliLiter(s) Inhalation every 6 hours                            14.4   6.38  )-----------( 184      ( 11 Mar 2025 12:09 )             42.6       Hemoglobin: 14.4 g/dL (03-11 @ 12:09)  Hemoglobin: 13.3 g/dL (03-10 @ 01:05)      03-11    135  |  95[L]  |  20[H]  ----------------------------<  99  3.4[L]   |  33[H]  |  1.19    Ca    9.0      11 Mar 2025 12:09  Phos  3.1     03-11  Mg     1.6     03-11      Creatinine Trend: 1.19<--, 1.19<--    COAGS:           T(C): 36.3 (03-12-25 @ 05:28), Max: 37.2 (03-11-25 @ 13:30)  HR: 42 (03-12-25 @ 05:28) (39 - 42)  BP: 141/71 (03-12-25 @ 05:28) (141/71 - 154/74)  RR: 17 (03-12-25 @ 05:28) (17 - 18)  SpO2: 96% (03-12-25 @ 05:28) (96% - 100%)  Wt(kg): --    I&O's Summary        HEENT:  (-)icterus (-)pallor  CV: N S1 S2 1/6 DIEGO (+)2 Pulses B/l  Resp:  Clear to ausculatation B/L, normal effort  GI: (+) BS Soft, NT, ND  Lymph:  (+)mild Edema, (-)obvious lymphadenopathy  Skin: Warm to touch, Normal turgor  Psych: Appropriate mood and affect      TELEMETRY: 	  off        ASSESSMENT/PLAN: 	89y  Male PMH of Asthma, Skin cancer, HTN, Gout,  DVT on xarelto, Afib/flutter with slow ventricular response previously evaluated by EP,  presenting s/p mechanical fall in s/o one day of lethargy and productive cough concern for PNA and CHF    # GOC  - f/u palliative eval to clarify goals of care    # Pulmonary edema  -  would check echo if within Goals of care  - gentle diuresis  - check prealbumin as malnutrition and low oncotic pressure can cause effusions and pulmonary edema via third spacing     # Afib  - cont xarelto, please confirm proprer dose as his crt cleareance is 58.  d/w resident    Juan Neri MD, [facility]  BEEPER [phone]    
C A R D I O L O G Y  **********************************     DATE OF SERVICE: 03-13-25    resting comfortable     acetaminophen   Oral Liquid .. 580 milliGRAM(s) Oral every 4 hours PRN  albuterol    90 MICROgram(s) HFA Inhaler 2 Puff(s) Inhalation every 6 hours  albuterol/ipratropium for Nebulization 3 milliLiter(s) Nebulizer every 6 hours  ALPRAZolam 0.5 milliGRAM(s) Oral at bedtime  cefTRIAXone   IVPB 1000 milliGRAM(s) IV Intermittent every 24 hours  furosemide   Injectable 40 milliGRAM(s) IV Push daily  glycopyrrolate Injectable 0.4 milliGRAM(s) IV Push every 6 hours PRN  lidocaine   4% Patch 1 Patch Transdermal daily  morphine  - Injectable 2 milliGRAM(s) IV Push every 4 hours PRN  pregabalin 25 milliGRAM(s) Oral daily  rivaroxaban 15 milliGRAM(s) Oral daily  sodium chloride 3%  Inhalation 4 milliLiter(s) Inhalation every 6 hours          Hemoglobin: 14.4 g/dL (03-11 @ 12:09)  Hemoglobin: 13.3 g/dL (03-10 @ 01:05)            Creatinine Trend: 1.19<--, 1.19<--    COAGS:           T(C): 36.5 (03-13-25 @ 05:34), Max: 37.1 (03-12-25 @ 13:09)  HR: 43 (03-13-25 @ 05:34) (42 - 68)  BP: 137/59 (03-13-25 @ 05:34) (127/65 - 143/72)  RR: 17 (03-13-25 @ 05:34) (17 - 17)  SpO2: 96% (03-13-25 @ 05:34) (96% - 99%)  Wt(kg): --    I&O's Summary      HEENT:  (-)icterus (-)pallor  CV: N S1 S2 1/6 DIEGO (+)2 Pulses B/l  Resp:  Clear to ausculatation B/L, normal effort  GI: (+) BS Soft, NT, ND  Lymph:  (+)mild Edema, (-)obvious lymphadenopathy  Skin: Warm to touch, Normal turgor  Psych: Appropriate mood and affect      TELEMETRY: 	  off        ASSESSMENT/PLAN: 	89y  Male PMH of Asthma, Skin cancer, HTN, Gout,  DVT on xarelto, Afib/flutter with slow ventricular response previously evaluated by EP,  presenting s/p mechanical fall in s/o one day of lethargy and productive cough concern for PNA and CHF    # GOC  - f/u palliative eval to clarify goals of care    # Pulmonary edema  -  would check echo if within Goals of care  - gentle diuresis  - check prealbumin as malnutrition and low oncotic pressure can cause effusions and pulmonary edema via third spacing     # Afib  - cont xarelto, please confirm proprer dose as his crt cleareance is 58.  d/w resident    Juan Neri MD, FACC  BEEPER [phone]    
C A R D I O L O G Y  **********************************     DATE OF SERVICE: 03-11-25    appears comfortable   	  MEDICATIONS:  MEDICATIONS  (STANDING):  albuterol    90 MICROgram(s) HFA Inhaler 2 Puff(s) Inhalation every 6 hours  albuterol/ipratropium for Nebulization 3 milliLiter(s) Nebulizer every 6 hours  ALPRAZolam 0.5 milliGRAM(s) Oral at bedtime  azithromycin  IVPB 500 milliGRAM(s) IV Intermittent every 24 hours  cefTRIAXone   IVPB 1000 milliGRAM(s) IV Intermittent every 24 hours  furosemide   Injectable 40 milliGRAM(s) IV Push daily  lidocaine   4% Patch 1 Patch Transdermal daily  pregabalin 25 milliGRAM(s) Oral daily  rivaroxaban 15 milliGRAM(s) Oral daily  sodium chloride 3%  Inhalation 4 milliLiter(s) Inhalation every 6 hours      LABS:	 	    CARDIAC MARKERS:                            13.3   6.36  )-----------( 156      ( 10 Mar 2025 01:05 )             38.7     Hemoglobin: 13.3 g/dL (03-10 @ 01:05)      03-10    134[L]  |  99  |  27[H]  ----------------------------<  109[H]  4.1   |  27  |  1.19    Ca    8.9      10 Mar 2025 01:05    TPro  6.6  /  Alb  2.6[L]  /  TBili  1.1  /  DBili  x   /  AST  40  /  ALT  29  /  AlkPhos  140[H]  03-10    Creatinine Trend: 1.19<--        PHYSICAL EXAM:  T(C): 36.7 (03-11-25 @ 05:31), Max: 36.7 (03-11-25 @ 05:31)  HR: 41 (03-11-25 @ 11:13) (40 - 47)  BP: 122/65 (03-11-25 @ 11:13) (122/65 - 163/79)  RR: 18 (03-11-25 @ 05:31) (17 - 18)  SpO2: 95% (03-11-25 @ 05:31) (95% - 100%)  Wt(kg): --  I&O's Summary    10 Mar 2025 07:01  -  11 Mar 2025 07:00  --------------------------------------------------------  IN: 0 mL / OUT: 200 mL / NET: -200 mL        HEENT:  (-)icterus (-)pallor  CV: N S1 S2 1/6 DIEGO (+)2 Pulses B/l  Resp:  Clear to ausculatation B/L, normal effort  GI: (+) BS Soft, NT, ND  Lymph:  (+)mild Edema, (-)obvious lymphadenopathy  Skin: Warm to touch, Normal turgor  Psych: Appropriate mood and affect      TELEMETRY: 	  off        ASSESSMENT/PLAN: 	89y  Male PMH of Asthma, Skin cancer, HTN, Gout,  DVT on xarelto, Afib/flutter with slow ventricular response previously evaluated by EP,  presenting s/p mechanical fall in s/o one day of lethargy and productive cough concern for PNA and CHF    # GOC  - f/u palliative eval to clarify goals of care    # Pulmonary edema  -  would check echo if within Goals of care  - gentle diuresis  - check prealbumin as malnutrition and low oncotic pressure can cause effusions and pulmonary edema via third spacing     # Afib  - cont xarelto, please confirm proprer dose as his crt cleareance is 58.  d/w resident    Juan Neri MD, [facility]  BEEPER [phone]    
Progress Note discussed with attending    MS TEAMS AUTHOR OF THIS NOTE TILL 5:00 PM  PLEASE CONTACT ON CALL TEAM:  - On Call Team (Please refer to [place]) FROM 5:00 PM - 8:30PM  - Nightfloat Team FROM 8:30 -7:30 AM    INTERVAL HPI/OVERNIGHT EVENTS: No acute events overnight.  Patient examined at bedside this AM.  Patient c/o rib pain, will give pain meds.    MEDICATIONS  (STANDING):  albuterol    90 MICROgram(s) HFA Inhaler 2 Puff(s) Inhalation every 6 hours  albuterol/ipratropium for Nebulization 3 milliLiter(s) Nebulizer every 6 hours  ALPRAZolam 0.5 milliGRAM(s) Oral at bedtime  cefTRIAXone   IVPB 1000 milliGRAM(s) IV Intermittent every 24 hours  furosemide   Injectable 40 milliGRAM(s) IV Push daily  lidocaine   4% Patch 1 Patch Transdermal daily  pregabalin 25 milliGRAM(s) Oral daily  rivaroxaban 15 milliGRAM(s) Oral daily  sodium chloride 3%  Inhalation 4 milliLiter(s) Inhalation every 6 hours    MEDICATIONS  (PRN):  acetaminophen   Oral Liquid .. 580 milliGRAM(s) Oral every 4 hours PRN Temp greater or equal to 38C (100.4F), Mild Pain (1 - 3)  glycopyrrolate Injectable 0.4 milliGRAM(s) IV Push every 6 hours PRN Excessive Secretions  morphine  - Injectable 2 milliGRAM(s) IV Push every 4 hours PRN Air hunger    REVIEW OF SYSTEMS:  unable to endorse    Vital Signs Last 24 Hrs  T(C): 36.3 (12 Mar 2025 05:28), Max: 37.2 (11 Mar 2025 13:30)  T(F): 97.3 (12 Mar 2025 05:28), Max: 99 (11 Mar 2025 13:30)  HR: 42 (12 Mar 2025 05:28) (39 - 42)  BP: 141/71 (12 Mar 2025 05:28) (122/65 - 154/74)  BP(mean): 88 (12 Mar 2025 05:28) (81 - 88)  RR: 17 (12 Mar 2025 05:28) (17 - 18)  SpO2: 96% (12 Mar 2025 05:28) (96% - 100%)    Parameters below as of 12 Mar 2025 05:28  Patient On (Oxygen Delivery Method): room air    PHYSICAL EXAMINATION:  GENERAL: NAD, elderly man, cachexic  HEAD:  Atraumatic, Normocephalic  EYES:  conjunctiva and sclera clear  CHEST/LUNG: No rales, wheezing, or rubs, coughing limiting exam,  HEART: bradycardic rate and rhythm; No murmurs, rubs, or gallops, coughing limiting exam  ABDOMEN: Soft, Nontender, Nondistended; Bowel sounds present  NERVOUS SYSTEM:  Alert & Oriented X1   EXTREMITIES:  2+ Peripheral Pulses, No clubbing, cyanosis, or edema  SKIN: warm dry                            14.4   6.38  )-----------( 184      ( 11 Mar 2025 12:09 )             42.6     03-11    135  |  95[L]  |  20[H]  ----------------------------<  99  3.4[L]   |  33[H]  |  1.19    Ca    9.0      11 Mar 2025 12:09  Phos  3.1     03-11  Mg     1.6     03-11              
Progress Note discussed with attending    MS TEAMS AUTHOR OF THIS NOTE TILL 5:00 PM  PLEASE CONTACT ON CALL TEAM:  - On Call Team (Please refer to [place]) FROM 5:00 PM - 8:30PM  - Nightfloat Team FROM 8:30 -7:30 AM    INTERVAL HPI/OVERNIGHT EVENTS: No acute events overnight.  Patient examined at bedside this AM.  Patient c/o rib pain.    MEDICATIONS  (STANDING):  albuterol    90 MICROgram(s) HFA Inhaler 2 Puff(s) Inhalation every 6 hours  albuterol/ipratropium for Nebulization 3 milliLiter(s) Nebulizer every 6 hours  ALPRAZolam 0.5 milliGRAM(s) Oral at bedtime  cefTRIAXone   IVPB 1000 milliGRAM(s) IV Intermittent every 24 hours  furosemide   Injectable 40 milliGRAM(s) IV Push daily  lidocaine   4% Patch 1 Patch Transdermal daily  pregabalin 25 milliGRAM(s) Oral daily  rivaroxaban 15 milliGRAM(s) Oral daily  sodium chloride 3%  Inhalation 4 milliLiter(s) Inhalation every 6 hours    MEDICATIONS  (PRN):  acetaminophen   Oral Liquid .. 580 milliGRAM(s) Oral every 4 hours PRN Temp greater or equal to 38C (100.4F), Mild Pain (1 - 3)  glycopyrrolate Injectable 0.4 milliGRAM(s) IV Push every 6 hours PRN Excessive Secretions  morphine  - Injectable 2 milliGRAM(s) IV Push every 4 hours PRN Air hunger      REVIEW OF SYSTEMS:  CONSTITUTIONAL: No fever, weight loss, or fatigue  RESPIRATORY: No shortness of breath  CARDIOVASCULAR: No chest pain  GASTROINTESTINAL: No abdominal pain.  GENITOURINARY: No dysuria  NEUROLOGICAL: No headaches  SKIN: No itching, burning, rashes    Vital Signs Last 24 Hrs  T(C): 36.5 (13 Mar 2025 05:34), Max: 37.1 (12 Mar 2025 13:09)  T(F): 97.7 (13 Mar 2025 05:34), Max: 98.8 (12 Mar 2025 13:09)  HR: 43 (13 Mar 2025 05:34) (42 - 68)  BP: 137/59 (13 Mar 2025 05:34) (127/65 - 143/72)  BP(mean): 74 (13 Mar 2025 05:34) (74 - 74)  RR: 17 (13 Mar 2025 05:34) (17 - 17)  SpO2: 96% (13 Mar 2025 05:34) (96% - 99%)    Parameters below as of 13 Mar 2025 05:34  Patient On (Oxygen Delivery Method): room air    PHYSICAL EXAMINATION:  GENERAL: NAD, elderly man, cachexic  HEAD:  Atraumatic, Normocephalic  EYES:  conjunctiva and sclera clear  CHEST/LUNG: No rales, wheezing, or rubs, coughing limiting exam,  HEART: bradycardic rate and rhythm; No murmurs, rubs, or gallops, coughing limiting exam  ABDOMEN: Soft, Nontender, Nondistended; Bowel sounds present  NERVOUS SYSTEM:  Alert & Oriented X1   EXTREMITIES:  2+ Peripheral Pulses, No clubbing, cyanosis, or edema  SKIN: warm dry                        14.4   6.38  )-----------( 184      ( 11 Mar 2025 12:09 )             42.6     03-11    135  |  95[L]  |  20[H]  ----------------------------<  99  3.4[L]   |  33[H]  |  1.19    Ca    9.0      11 Mar 2025 12:09  Phos  3.1     03-11  Mg     1.6     03-11              
follow up on:  complex medical decision making related to goals of care    [facility] and Palliative Consult Service:  Mara Reid DO: cell ([phone])  Bridger Webster MD: cell ([phone])  Andrea Kelly NP: cell ([phone])   Jose Tomas LMSW: cell ([phone])   Jessica Fleischer-Black, MD: cell: ([phone])    Can contact any Palliative Team member via [employer] Teams for consults and questions    OVERNIGHT EVENTS: Improvement in resp sxs overnight. Now on RA and more conversant, less fatigued    Present Symptoms: Mild,    Review of Systems: Unable to obtain due to poor mentation  Present Symptoms: Mild, Moderate, Severe  Pain:             Location -                               Aggravating factors -             Quality -             Radiation -             Timing-             Severity (0-10 scale):             Minimal acceptable level (0-10 scale):  Fatigue:  Nausea:  Lack of Appetite:   SOB: mild  Depression:  Anxiety:  Constipation:     CPOT:    https://ccs-st.org/resources/Documents/Sedation%20Analgesia%20Delirium%20in%20CC/CCS%20STH%20Guideline%20template%20CPOT.pdf  PAIN AD Score:   [email or web address]/[email or web address] (press ctrl +  left click to view)MEDICATIONS  (STANDING):  albuterol    90 MICROgram(s) HFA Inhaler 2 Puff(s) Inhalation every 6 hours  albuterol/ipratropium for Nebulization 3 milliLiter(s) Nebulizer every 6 hours  ALPRAZolam 0.5 milliGRAM(s) Oral at bedtime  azithromycin  IVPB 500 milliGRAM(s) IV Intermittent every 24 hours  cefTRIAXone   IVPB 1000 milliGRAM(s) IV Intermittent every 24 hours  furosemide   Injectable 40 milliGRAM(s) IV Push daily  lidocaine   4% Patch 1 Patch Transdermal daily  pregabalin 25 milliGRAM(s) Oral daily  rivaroxaban 15 milliGRAM(s) Oral daily  sodium chloride 3%  Inhalation 4 milliLiter(s) Inhalation every 6 hours    MEDICATIONS  (PRN):  acetaminophen   Oral Liquid .. 580 milliGRAM(s) Oral every 4 hours PRN Temp greater or equal to 38C (100.4F), Mild Pain (1 - 3)  glycopyrrolate Injectable 0.4 milliGRAM(s) IV Push every 6 hours PRN Excessive Secretions  morphine  - Injectable 2 milliGRAM(s) IV Push every 4 hours PRN Air hunger      PHYSICAL EXAM:  Vital Signs Last 24 Hrs  T(C): 36.7 (11 Mar 2025 05:31), Max: 36.7 (11 Mar 2025 05:31)  T(F): 98.1 (11 Mar 2025 05:31), Max: 98.1 (11 Mar 2025 05:31)  HR: 41 (11 Mar 2025 11:13) (40 - 47)  BP: 122/65 (11 Mar 2025 11:13) (122/65 - 163/79)  BP(mean): 81 (11 Mar 2025 11:13) (80 - 107)  RR: 18 (11 Mar 2025 05:31) (17 - 18)  SpO2: 95% (11 Mar 2025 05:31) (95% - 100%)    Parameters below as of 11 Mar 2025 05:31  Patient On (Oxygen Delivery Method): room air        General: alert  oriented x 1    verbal    Palliative Performance Scale/Karnofsky Score: 20%  http://npcrc.org/files/news/palliative_performance_scale_ppsv2.pdf    HEENT: no abnormal lesion  Lungs: CTA b/l  CV: RRR, S1S2  GI: soft non distended non tender  incontinent   : incontinent   Musculoskeletal: weakness x4    fully bedbound/wheelchair bound  Skin: no abnormal skin lesions  Neuro: no deficits  Oral intake ability:  minimal  capability    LABS:                          13.3   6.36  )-----------( 156      ( 10 Mar 2025 01:05 )             38.7     03-10    134[L]  |  99  |  27[H]  ----------------------------<  109[H]  4.1   |  27  |  1.19    Ca    8.9      10 Mar 2025 01:05    TPro  6.6  /  Alb  2.6[L]  /  TBili  1.1  /  DBili  x   /  AST  40  /  ALT  29  /  AlkPhos  140[H]  03-10    Urinalysis Basic - ( 10 Mar 2025 01:05 )    Color: x / Appearance: x / SG: x / pH: x  Gluc: 109 mg/dL / Ketone: x  / Bili: x / Urobili: x   Blood: x / Protein: x / Nitrite: x   Leuk Esterase: x / RBC: x / WBC x   Sq Epi: x / Non Sq Epi: x / Bacteria: x        RADIOLOGY & ADDITIONAL STUDIES: reviewed
Progress Note discussed with attending    MS TEAMS AUTHOR OF THIS NOTE TILL 5:00 PM  PLEASE CONTACT ON CALL TEAM:  - On Call Team (Please refer to [place]) FROM 5:00 PM - 8:30PM  - Nightfloat Team FROM 8:30 -7:30 AM    INTERVAL HPI/OVERNIGHT EVENTS: No acute events overnight.  Patient examined at bedside this AM.  Patient unable to endorse complaints, but does say things are bad.    MEDICATIONS  (STANDING):  albuterol    90 MICROgram(s) HFA Inhaler 2 Puff(s) Inhalation every 6 hours  albuterol/ipratropium for Nebulization 3 milliLiter(s) Nebulizer every 6 hours  ALPRAZolam 0.5 milliGRAM(s) Oral at bedtime  azithromycin  IVPB 500 milliGRAM(s) IV Intermittent every 24 hours  cefTRIAXone   IVPB 1000 milliGRAM(s) IV Intermittent every 24 hours  furosemide   Injectable 40 milliGRAM(s) IV Push daily  lidocaine   4% Patch 1 Patch Transdermal daily  potassium chloride   Powder 40 milliEquivalent(s) Oral once  pregabalin 25 milliGRAM(s) Oral daily  rivaroxaban 15 milliGRAM(s) Oral daily  sodium chloride 3%  Inhalation 4 milliLiter(s) Inhalation every 6 hours    MEDICATIONS  (PRN):  acetaminophen   Oral Liquid .. 580 milliGRAM(s) Oral every 4 hours PRN Temp greater or equal to 38C (100.4F), Mild Pain (1 - 3)  glycopyrrolate Injectable 0.4 milliGRAM(s) IV Push every 6 hours PRN Excessive Secretions  morphine  - Injectable 2 milliGRAM(s) IV Push every 4 hours PRN Air hunger    REVIEW OF SYSTEMS:  Patient unable to endorse complaints    Vital Signs Last 24 Hrs  T(C): 37.2 (11 Mar 2025 13:30), Max: 37.2 (11 Mar 2025 13:30)  T(F): 99 (11 Mar 2025 13:30), Max: 99 (11 Mar 2025 13:30)  HR: 39 (11 Mar 2025 13:30) (39 - 47)  BP: 154/74 (11 Mar 2025 13:30) (122/65 - 163/79)  BP(mean): 81 (11 Mar 2025 11:13) (80 - 107)  RR: 18 (11 Mar 2025 13:30) (17 - 18)  SpO2: 99% (11 Mar 2025 13:30) (95% - 100%)    Parameters below as of 11 Mar 2025 13:30  Patient On (Oxygen Delivery Method): room air    PHYSICAL EXAMINATION:  GENERAL: NAD, elderly man, cachexic coughing a lot  HEAD:  Atraumatic, Normocephalic  EYES:  conjunctiva and sclera clear  CHEST/LUNG: No rales, wheezing, or rubs, coughing limiting exam, + rhonchi  HEART: bradycardic rate and rhythm; No murmurs, rubs, or gallops, coughing limiting exam  ABDOMEN: Soft, Nontender, Nondistended; Bowel sounds present  NERVOUS SYSTEM:  Alert & Oriented X1   EXTREMITIES:  2+ Peripheral Pulses, No clubbing, cyanosis, or edema  SKIN: warm dry                          14.4   6.38  )-----------( 184      ( 11 Mar 2025 12:09 )             42.6     03-11    135  |  95[L]  |  20[H]  ----------------------------<  99  3.4[L]   |  33[H]  |  1.19    Ca    9.0      11 Mar 2025 12:09  Phos  3.1     03-11  Mg     1.6     03-11    TPro  6.6  /  Alb  2.6[L]  /  TBili  1.1  /  DBili  x   /  AST  40  /  ALT  29  /  AlkPhos  140[H]  03-10    LIVER FUNCTIONS - ( 10 Mar 2025 01:05 )  Alb: 2.6 g/dL / Pro: 6.6 g/dL / ALK PHOS: 140 U/L / ALT: 29 U/L DA / AST: 40 U/L / GGT: x

## 2025-03-13 NOTE — DISCHARGE NOTE NURSING/CASE MANAGEMENT/SOCIAL WORK - PATIENT PORTAL LINK FT
You can access the FollowMyHealth Patient Portal offered by [facility] by registering at the following website: [email or web address]. By joining [facility]’s FollowMyHealth portal, you will also be able to view your health information using other applications (apps) compatible with our system.

## 2025-03-20 ENCOUNTER — TRANSCRIPTION ENCOUNTER (OUTPATIENT)
Age: 89
End: 2025-03-20

## 2025-03-27 ENCOUNTER — TRANSCRIPTION ENCOUNTER (OUTPATIENT)
Age: 89
End: 2025-03-27

## 2025-04-04 ENCOUNTER — TRANSCRIPTION ENCOUNTER (OUTPATIENT)
Age: 89
End: 2025-04-04

## 2025-04-11 ENCOUNTER — TRANSCRIPTION ENCOUNTER (OUTPATIENT)
Age: 89
End: 2025-04-11